# Patient Record
Sex: FEMALE | Race: WHITE | HISPANIC OR LATINO | Employment: UNEMPLOYED | URBAN - METROPOLITAN AREA
[De-identification: names, ages, dates, MRNs, and addresses within clinical notes are randomized per-mention and may not be internally consistent; named-entity substitution may affect disease eponyms.]

---

## 2017-01-03 ENCOUNTER — OFFICE VISIT (OUTPATIENT)
Dept: OPHTHALMOLOGY | Facility: CLINIC | Age: 82
End: 2017-01-03
Payer: MEDICARE

## 2017-01-03 DIAGNOSIS — H25.12 NUCLEAR SCLEROSIS, LEFT: Primary | ICD-10-CM

## 2017-01-03 DIAGNOSIS — Z96.1 PSEUDOPHAKIA OF RIGHT EYE: ICD-10-CM

## 2017-01-03 DIAGNOSIS — H16.223 KERATOCONJUNCTIVITIS SICCA DUE TO DECREASED TEAR PRODUCTION, BILATERAL: ICD-10-CM

## 2017-01-03 DIAGNOSIS — H52.4 PRESBYOPIA: ICD-10-CM

## 2017-01-03 PROCEDURE — 92012 INTRM OPH EXAM EST PATIENT: CPT | Mod: S$PBB,,, | Performed by: OPTOMETRIST

## 2017-01-03 PROCEDURE — 99211 OFF/OP EST MAY X REQ PHY/QHP: CPT | Mod: PBBFAC,PO | Performed by: OPTOMETRIST

## 2017-01-03 PROCEDURE — 99999 PR PBB SHADOW E&M-EST. PATIENT-LVL I: CPT | Mod: PBBFAC,,, | Performed by: OPTOMETRIST

## 2017-01-03 NOTE — PROGRESS NOTES
"HPI     Dry Eye    Additional comments: refraction           Comments   PT was seen for full exam on 11/29/16 with DNL. PT was told to rtc 4-5   weeks for refraction.  Restasis bid OU  PT states her eyes feel much better but still notices occasional fb   sensation.          Last edited by Rose Bazzi MA on 1/3/2017 10:07 AM. (History)            Assessment /Plan     For exam results, see Encounter Report.    Nuclear sclerosis, left  Minimal improvement in va OD with refraction, no improvement OS due to cat  Refer to Dr Ann for cat eval, next available  Discussed with pt and her daughter that va may be limited due to AMD / "macular changes"  OCT down today    Keratoconjunctivitis sicca due to decreased tear production, bilateral  Improved since starting drops  Continue Restasis bid OU    Pseudophakia of right eye  Presbyopia      RTC for cat eval with Dr Ann and mOCT, next available                 "

## 2017-02-08 ENCOUNTER — OFFICE VISIT (OUTPATIENT)
Dept: OPHTHALMOLOGY | Facility: CLINIC | Age: 82
End: 2017-02-08
Payer: MEDICARE

## 2017-02-08 DIAGNOSIS — Z96.1 PSEUDOPHAKIA OF RIGHT EYE: ICD-10-CM

## 2017-02-08 DIAGNOSIS — H25.12 NUCLEAR SCLEROSIS, LEFT: Primary | ICD-10-CM

## 2017-02-08 DIAGNOSIS — H04.123 DRY EYE SYNDROME, BILATERAL: ICD-10-CM

## 2017-02-08 PROCEDURE — 99212 OFFICE O/P EST SF 10 MIN: CPT | Mod: PBBFAC,PO | Performed by: OPHTHALMOLOGY

## 2017-02-08 PROCEDURE — 99999 PR PBB SHADOW E&M-EST. PATIENT-LVL II: CPT | Mod: PBBFAC,,, | Performed by: OPHTHALMOLOGY

## 2017-02-08 PROCEDURE — 92136 OPHTHALMIC BIOMETRY: CPT | Mod: PBBFAC,PO,LT | Performed by: OPHTHALMOLOGY

## 2017-02-08 PROCEDURE — 92014 COMPRE OPH EXAM EST PT 1/>: CPT | Mod: S$PBB,,, | Performed by: OPHTHALMOLOGY

## 2017-02-08 RX ORDER — DIFLUPREDNATE OPHTHALMIC 0.5 MG/ML
1 EMULSION OPHTHALMIC 4 TIMES DAILY
Qty: 1 BOTTLE | Refills: 1 | Status: SHIPPED | OUTPATIENT
Start: 2017-02-08 | End: 2017-03-10

## 2017-02-08 RX ORDER — POLYMYXIN B SULFATE AND TRIMETHOPRIM 1; 10000 MG/ML; [USP'U]/ML
1 SOLUTION OPHTHALMIC 4 TIMES DAILY
Qty: 1 BOTTLE | Refills: 1 | OUTPATIENT
Start: 2017-02-08 | End: 2017-02-18

## 2017-02-08 NOTE — PROGRESS NOTES
HPI     Cataract    Additional comments: Possible eval per DNL           Comments   Pt states that her VA has been ok. Has been better. Can notice decline.   Patient's daughter is here and serves as an .    PCP: Dr. Felder  Referred by DNL    AMD  NS OS  Dry Eye    Restasis BID OU       Last edited by AYDIN Sanders on 2/8/2017 10:14 AM. (History)            Assessment /Plan     For exam results, see Encounter Report.      ICD-10-CM ICD-9-CM    1. Nuclear sclerosis, left H25.12 366.16 Visually Significant Cataract OS  Patient reports decreased vision consistent with the clinical amount of lenticular opacity, which reaches the level of visual significance and affects activities of daily living including reading and glare. Risks, benefits, and alternatives to cataract surgery were discussed.   The pt expressed a desire to proceed with surgery with the potential for some reasonable degree of visual improvement.    Discussed IOL options and refractive outcomes for this patient.    Phaco left eye,   Block  monofocal IOL.  Will aim for distance  Referral to [facility] for Ophthalmic [facility]  Prescriptions sent for preoperative medications  Durezol, Polytrim, and Ilevro  Explained that patient may need glasses after surgery.  Discussed that vision may be limited by astigmatism.           2. Pseudophakia of right eye Z96.1 V43.1    3. Dry eye syndrome, bilateral H04.123 375.15        Return for cataract surgery left eye

## 2017-03-07 ENCOUNTER — TELEPHONE (OUTPATIENT)
Dept: OPHTHALMOLOGY | Facility: CLINIC | Age: 82
End: 2017-03-07

## 2017-03-07 NOTE — TELEPHONE ENCOUNTER
----- Message from Norma Maxwell sent at 3/7/2017 10:44 AM CST -----  Contact: patient  Patient called to advise that Polymyxin B not called into pharmacy.  Drops start today, so please call them in today to pharmacy on record, then contact patient to advise at number given.  Thanks,  Norma

## 2017-03-07 NOTE — TELEPHONE ENCOUNTER
Called patient to inform that drops had been called into pharmacy. No answer at 3 numbers listed in chart. Message left on home phone that drops had been called in and to call and verify with pharmacy that they are ready prior to attempting to .

## 2017-03-10 ENCOUNTER — OFFICE VISIT (OUTPATIENT)
Dept: OPHTHALMOLOGY | Facility: CLINIC | Age: 82
End: 2017-03-10
Payer: MEDICARE

## 2017-03-10 DIAGNOSIS — Z98.42 CATARACT EXTRACTION STATUS, LEFT: Primary | ICD-10-CM

## 2017-03-10 PROCEDURE — 99024 POSTOP FOLLOW-UP VISIT: CPT | Mod: ,,, | Performed by: OPHTHALMOLOGY

## 2017-03-10 PROCEDURE — 99211 OFF/OP EST MAY X REQ PHY/QHP: CPT | Mod: PBBFAC,PO | Performed by: OPHTHALMOLOGY

## 2017-03-10 PROCEDURE — 99999 PR PBB SHADOW E&M-EST. PATIENT-LVL I: CPT | Mod: PBBFAC,,, | Performed by: OPHTHALMOLOGY

## 2017-03-10 RX ORDER — POLYMYXIN B SULFATE AND TRIMETHOPRIM 1; 10000 MG/ML; [USP'U]/ML
1 SOLUTION OPHTHALMIC 4 TIMES DAILY
COMMUNITY
End: 2017-04-18 | Stop reason: ALTCHOICE

## 2017-03-10 NOTE — PROGRESS NOTES
HPI     Patient is here for one day phaco os.    PCP: Dr. Felder  Referred by DNL  PCIOL OD X 10 YEARS   PCIOL OS 03/09/17  AMD  NS OS  Dry Eye    Restasis BID OU    OS DUREZOL QID, POLY TRIM QID, ILEVRO ONCE DAILY            Last edited by AYDIN Sanders on 3/10/2017  8:14 AM.         Assessment /Plan     For exam results, see Encounter Report.      ICD-10-CM ICD-9-CM    1. Cataract extraction status, left Z98.42 V45.61        PO Day 1 S/P Phaco/IOL  left eye  Doing well.    Use Durezol QID  Ilevro daily  Polymyxin B 4 x day  Reinstructed in importance of absolute compliance with Post-OP instructions including medications, shield at bedtime, and limitation of activities. Follow up appointments in approximately one and six weeks or call immediately for increased pain, redness or vision loss.   Will resume care with Dr. Villeda at her next visit in 1 week

## 2017-03-21 ENCOUNTER — OFFICE VISIT (OUTPATIENT)
Dept: OPHTHALMOLOGY | Facility: CLINIC | Age: 82
End: 2017-03-21
Payer: MEDICARE

## 2017-03-21 DIAGNOSIS — Z98.42 CATARACT EXTRACTION STATUS, LEFT: Primary | ICD-10-CM

## 2017-03-21 PROCEDURE — 99024 POSTOP FOLLOW-UP VISIT: CPT | Mod: ,,, | Performed by: OPHTHALMOLOGY

## 2017-03-21 PROCEDURE — 99211 OFF/OP EST MAY X REQ PHY/QHP: CPT | Mod: PBBFAC,PO | Performed by: OPHTHALMOLOGY

## 2017-03-21 PROCEDURE — 99999 PR PBB SHADOW E&M-EST. PATIENT-LVL I: CPT | Mod: PBBFAC,,, | Performed by: OPHTHALMOLOGY

## 2017-03-21 RX ORDER — DIFLUPREDNATE OPHTHALMIC 0.5 MG/ML
1 EMULSION OPHTHALMIC 4 TIMES DAILY
COMMUNITY
End: 2017-04-18 | Stop reason: ALTCHOICE

## 2017-03-21 NOTE — PROGRESS NOTES
HPI     Post-op Evaluation    Additional comments: OS; Durezol, Polytrim QID, Ilevro QD           Comments   1 wk PO PCIOL OS  No pain or discomfort  VA improving OS    PCP: Dr. Felder  Referred by DNL  PCIOL OD X 10 YEARS   PCIOL OS +23.5 SN60WF / CDE:27.94 / 03/09/17  AMD  Dry Eye    Restasis BID OU    OS DUREZOL QID, POLY TRIM QID, ILEVRO ONCE DAILY       Last edited by Skylar Schultz on 3/21/2017 10:59 AM. (History)            Assessment /Plan     For exam results, see Encounter Report.      ICD-10-CM ICD-9-CM    1. Cataract extraction status, left Z98.42 V45.61        PO Week 1 S/P Phaco/ IOL left eye:   Doing well with no evidence of infection or abnormal inflammation.     MOCT Done today and WNL    D/C antibiotic drops  Taper Durezol weekly per instruction sheet.  Ilevro daily 7 more days.  Resume normal activitites and d/c eye shield.  OTC reading glasses can be used until evaluated for final MR.  D/c Shield at night    RTC 4 weeks  With Dr. Nicole  or PRN pain, redness, vision loss, or other concerns.

## 2017-04-18 ENCOUNTER — OFFICE VISIT (OUTPATIENT)
Dept: OPHTHALMOLOGY | Facility: CLINIC | Age: 82
End: 2017-04-18
Payer: MEDICARE

## 2017-04-18 DIAGNOSIS — Z98.42 CATARACT EXTRACTION STATUS, LEFT: Primary | ICD-10-CM

## 2017-04-18 PROCEDURE — 99024 POSTOP FOLLOW-UP VISIT: CPT | Mod: ,,, | Performed by: OPTOMETRIST

## 2017-04-18 PROCEDURE — 99999 PR PBB SHADOW E&M-EST. PATIENT-LVL I: CPT | Mod: PBBFAC,,, | Performed by: OPTOMETRIST

## 2017-04-18 PROCEDURE — 99211 OFF/OP EST MAY X REQ PHY/QHP: CPT | Mod: PBBFAC,PO | Performed by: OPTOMETRIST

## 2017-04-18 NOTE — PROGRESS NOTES
HPI     Post-op Evaluation    Additional comments: 4 wk s/p phaco os. no pain or irritation.            Comments   Last seen by mgm 3/21/17 for cataract post op. Last seen by dnl on   1/3/2017    PCIOL OD X 10 YEARS   PCIOL OS +23.5 SN60WF / CDE:27.94 / 03/09/17 w/mgm  AMD  Dry Eye    Restasis BID OU       Last edited by Anya Cohen MA on 4/18/2017 10:42 AM. (History)            Assessment /Plan     For exam results, see Encounter Report.    Cataract extraction status, left    Doing well OU  New spec rx given    Eyeglass Final Rx     Eyeglass Final Rx      Sphere Cylinder Axis   Right -1.00 +1.25 175   Left -2.00 +2.50 175       Type:  SVL    Expiration Date:  4/19/2018    Comments:  distance      Eyeglass Final Rx #2      Sphere Cylinder Axis   Right +1.75 +1.25 175   Left +0.75 +2.50 175       Type:  SVL    Expiration Date:  4/19/2018    Comments:  Near/reading              Pt requested SVL    RTC 6 months for dilated eye exam, PRN sooner if any problems or concerns

## 2017-08-10 ENCOUNTER — OFFICE VISIT (OUTPATIENT)
Dept: INTERNAL MEDICINE | Facility: CLINIC | Age: 82
End: 2017-08-10
Payer: MEDICARE

## 2017-08-10 ENCOUNTER — LAB VISIT (OUTPATIENT)
Dept: LAB | Facility: HOSPITAL | Age: 82
End: 2017-08-10
Attending: FAMILY MEDICINE
Payer: MEDICARE

## 2017-08-10 VITALS
BODY MASS INDEX: 32.46 KG/M2 | WEIGHT: 171.94 LBS | SYSTOLIC BLOOD PRESSURE: 153 MMHG | RESPIRATION RATE: 16 BRPM | HEART RATE: 72 BPM | DIASTOLIC BLOOD PRESSURE: 71 MMHG | TEMPERATURE: 98 F | OXYGEN SATURATION: 96 % | HEIGHT: 61 IN

## 2017-08-10 DIAGNOSIS — R06.00 PAROXYSMAL NOCTURNAL DYSPNEA: ICD-10-CM

## 2017-08-10 DIAGNOSIS — R06.09 DYSPNEA ON EXERTION: ICD-10-CM

## 2017-08-10 DIAGNOSIS — I10 ESSENTIAL HYPERTENSION: ICD-10-CM

## 2017-08-10 DIAGNOSIS — I10 ESSENTIAL HYPERTENSION: Primary | ICD-10-CM

## 2017-08-10 LAB
ALBUMIN SERPL BCP-MCNC: 3.9 G/DL
ALP SERPL-CCNC: 84 U/L
ALT SERPL W/O P-5'-P-CCNC: 21 U/L
ANION GAP SERPL CALC-SCNC: 9 MMOL/L
AST SERPL-CCNC: 28 U/L
BASOPHILS # BLD AUTO: 0.04 K/UL
BASOPHILS NFR BLD: 0.6 %
BILIRUB SERPL-MCNC: 0.3 MG/DL
BUN SERPL-MCNC: 16 MG/DL
CALCIUM SERPL-MCNC: 8.8 MG/DL
CHLORIDE SERPL-SCNC: 104 MMOL/L
CO2 SERPL-SCNC: 26 MMOL/L
CREAT SERPL-MCNC: 0.7 MG/DL
DIFFERENTIAL METHOD: NORMAL
EOSINOPHIL # BLD AUTO: 0.3 K/UL
EOSINOPHIL NFR BLD: 4.7 %
ERYTHROCYTE [DISTWIDTH] IN BLOOD BY AUTOMATED COUNT: 13.6 %
EST. GFR  (AFRICAN AMERICAN): >60 ML/MIN/1.73 M^2
EST. GFR  (NON AFRICAN AMERICAN): >60 ML/MIN/1.73 M^2
GLUCOSE SERPL-MCNC: 107 MG/DL
HCT VFR BLD AUTO: 38 %
HGB BLD-MCNC: 12.4 G/DL
LYMPHOCYTES # BLD AUTO: 2.8 K/UL
LYMPHOCYTES NFR BLD: 40.8 %
MCH RBC QN AUTO: 30.3 PG
MCHC RBC AUTO-ENTMCNC: 32.6 G/DL
MCV RBC AUTO: 93 FL
MONOCYTES # BLD AUTO: 0.5 K/UL
MONOCYTES NFR BLD: 7.8 %
NEUTROPHILS # BLD AUTO: 3.1 K/UL
NEUTROPHILS NFR BLD: 46 %
PLATELET # BLD AUTO: 257 K/UL
PMV BLD AUTO: 9.5 FL
POTASSIUM SERPL-SCNC: 4.1 MMOL/L
PROT SERPL-MCNC: 7.5 G/DL
RBC # BLD AUTO: 4.09 M/UL
SODIUM SERPL-SCNC: 139 MMOL/L
WBC # BLD AUTO: 6.79 K/UL

## 2017-08-10 PROCEDURE — 1159F MED LIST DOCD IN RCRD: CPT | Mod: ,,, | Performed by: FAMILY MEDICINE

## 2017-08-10 PROCEDURE — 36415 COLL VENOUS BLD VENIPUNCTURE: CPT | Mod: PO

## 2017-08-10 PROCEDURE — 99999 PR PBB SHADOW E&M-EST. PATIENT-LVL III: CPT | Mod: PBBFAC,,, | Performed by: FAMILY MEDICINE

## 2017-08-10 PROCEDURE — 3008F BODY MASS INDEX DOCD: CPT | Mod: ,,, | Performed by: FAMILY MEDICINE

## 2017-08-10 PROCEDURE — 85025 COMPLETE CBC W/AUTO DIFF WBC: CPT | Mod: PO

## 2017-08-10 PROCEDURE — 80053 COMPREHEN METABOLIC PANEL: CPT | Mod: PO

## 2017-08-10 PROCEDURE — 99214 OFFICE O/P EST MOD 30 MIN: CPT | Mod: S$PBB,,, | Performed by: FAMILY MEDICINE

## 2017-08-10 PROCEDURE — 1126F AMNT PAIN NOTED NONE PRSNT: CPT | Mod: ,,, | Performed by: FAMILY MEDICINE

## 2017-08-10 RX ORDER — AMLODIPINE BESYLATE 10 MG/1
10 TABLET ORAL DAILY
Qty: 90 TABLET | Refills: 0 | Status: SHIPPED | OUTPATIENT
Start: 2017-08-10 | End: 2017-09-11

## 2017-08-10 NOTE — PROGRESS NOTES
Subjective:       Patient ID: Josephine Squires is a 86 y.o. female.    Chief Complaint: Fatigue; Headache; and Dizziness    HPI  Josephine is here today with her daughter because over the last month she has been feeling more fatigued and has had some spells whenever she wakes up in the morning of lightheadedness and in fact she has had some falls over the last month or so.  She describes the feeling as somewhat like a hangover.  She does not have this sensation every day and most of this time is fine.  They have been monitoring her blood pressure at home and it has been quite elevated with sometimes having systolic readings around 180.  She has been taking her medication consistently at 5 mg of amlodipine.  She does continue to have to sleep on 3-4 pillows every night to help keep her head elevated.  Whenever she rolls off the pillow she will have a sensation of choking and feels like she is short of breath.  She does have decreased exercise tolerance and with walking will have to take frequent breaks to catch her breath.    No family history on file.    Current Outpatient Prescriptions:     amlodipine (NORVASC) 10 MG tablet, Take 1 tablet (10 mg total) by mouth once daily., Disp: 90 tablet, Rfl: 0    aspirin (ECOTRIN) 81 MG EC tablet, Take 81 mg by mouth., Disp: , Rfl:     cycloSPORINE (RESTASIS) 0.05 % ophthalmic emulsion, Place 0.4 mLs (1 drop total) into both eyes 2 (two) times daily., Disp: 180 vial, Rfl: 3    atorvastatin (LIPITOR) 10 MG tablet, Take 1 tablet (10 mg total) by mouth once daily., Disp: 90 tablet, Rfl: 3    Review of Systems   Constitutional: Positive for fatigue. Negative for chills and fever.   Respiratory: Positive for shortness of breath. Negative for cough.    Cardiovascular: Negative for chest pain.   Gastrointestinal: Negative for abdominal pain.   Skin: Negative for rash.   Neurological: Positive for light-headedness. Negative for dizziness.       Objective:   BP (!) 153/71 (BP Location: Left  "arm, Patient Position: Sitting, BP Method: Large (Automatic))   Pulse 72   Temp 98.1 °F (36.7 °C) (Tympanic)   Resp 16   Ht 5' 1" (1.549 m)   Wt 78 kg (171 lb 15.3 oz)   SpO2 96%   BMI 32.49 kg/m²      Physical Exam   Constitutional: She is oriented to person, place, and time. She appears well-developed and well-nourished.   HENT:   Head: Normocephalic and atraumatic.   Eyes: Conjunctivae are normal.   Cardiovascular: Normal rate.    Pulmonary/Chest: Effort normal. No respiratory distress.   Musculoskeletal: She exhibits no edema.   Neurological: She is alert and oriented to person, place, and time. Coordination normal.   Skin: Skin is warm and dry. No rash noted.   Psychiatric: She has a normal mood and affect. Her behavior is normal.   Vitals reviewed.      Assessment & Plan     1. Essential hypertension  Elevated today.  Seems it is also been elevated frequently at home.  We will increase her medication back to 10 mg of amlodipine.  - Comprehensive metabolic panel; Future    2. Paroxysmal nocturnal dyspnea  Still concerned that her symptoms could be secondary to cardiac insufficiency versus sleep apnea.  She also has a known history of GERD which could be contributing.  We'll like to get some blood work and also an echocardiogram to look into the symptoms that she is having.  I do not think they are postural in nature as she had a negative lateral gaze test and does not feel dizziness whenever she was laying down or sitting up in the exam room.    3. Dyspnea on exertion  As above  - CBC auto differential; Future  - 2D Echo w/ Color Flow Doppler; Future    Would also consider starting her back on a PPI if the symptoms continued.  We'll have her follow-up in 4 weeks  "

## 2017-08-11 ENCOUNTER — HOSPITAL ENCOUNTER (OUTPATIENT)
Dept: CARDIOLOGY | Facility: CLINIC | Age: 82
Discharge: HOME | End: 2017-08-11
Payer: MEDICARE

## 2017-08-11 DIAGNOSIS — R06.09 DYSPNEA ON EXERTION: ICD-10-CM

## 2017-08-11 LAB
AORTIC VALVE REGURGITATION: NORMAL
DIASTOLIC DYSFUNCTION: NO
MITRAL VALVE REGURGITATION: NORMAL
RETIRED EF AND QEF - SEE NOTES: 60 (ref 55–65)

## 2017-08-11 PROCEDURE — 93306 TTE W/DOPPLER COMPLETE: CPT | Mod: PBBFAC,PO | Performed by: NUCLEAR MEDICINE

## 2017-09-11 ENCOUNTER — OFFICE VISIT (OUTPATIENT)
Dept: INTERNAL MEDICINE | Facility: CLINIC | Age: 82
End: 2017-09-11
Payer: MEDICARE

## 2017-09-11 VITALS
DIASTOLIC BLOOD PRESSURE: 77 MMHG | WEIGHT: 173.31 LBS | HEIGHT: 61 IN | HEART RATE: 69 BPM | OXYGEN SATURATION: 96 % | RESPIRATION RATE: 18 BRPM | TEMPERATURE: 98 F | SYSTOLIC BLOOD PRESSURE: 167 MMHG | BODY MASS INDEX: 32.72 KG/M2

## 2017-09-11 DIAGNOSIS — R51.9 NONINTRACTABLE EPISODIC HEADACHE, UNSPECIFIED HEADACHE TYPE: ICD-10-CM

## 2017-09-11 DIAGNOSIS — I10 ESSENTIAL HYPERTENSION: Primary | ICD-10-CM

## 2017-09-11 PROCEDURE — 99214 OFFICE O/P EST MOD 30 MIN: CPT | Mod: S$PBB,,, | Performed by: FAMILY MEDICINE

## 2017-09-11 PROCEDURE — 99999 PR PBB SHADOW E&M-EST. PATIENT-LVL III: CPT | Mod: PBBFAC,,, | Performed by: FAMILY MEDICINE

## 2017-09-11 PROCEDURE — 99213 OFFICE O/P EST LOW 20 MIN: CPT | Mod: PBBFAC,PO | Performed by: FAMILY MEDICINE

## 2017-09-11 PROCEDURE — 1159F MED LIST DOCD IN RCRD: CPT | Mod: ,,, | Performed by: FAMILY MEDICINE

## 2017-09-11 PROCEDURE — 1125F AMNT PAIN NOTED PAIN PRSNT: CPT | Mod: ,,, | Performed by: FAMILY MEDICINE

## 2017-09-11 RX ORDER — LISINOPRIL AND HYDROCHLOROTHIAZIDE 10; 12.5 MG/1; MG/1
1 TABLET ORAL DAILY
Qty: 30 TABLET | Refills: 5 | Status: SHIPPED | OUTPATIENT
Start: 2017-09-11 | End: 2018-04-04 | Stop reason: SDUPTHER

## 2017-09-12 NOTE — PROGRESS NOTES
"Subjective:       Patient ID: Josephine Squires is a 86 y.o. female.    Chief Complaint: Follow-up (htn); Joint Swelling (left); and Headache    HPI  Here today to follow-up on recent dizziness episodes.  These have completely resolved since the last visit but have been replaced by near daily headaches over the last month.  Headaches will get somewhat better during the day after she takes medication but usually return later.  She is not waking up with headaches but does have them in the morning soon after waking up at times.  No blurry vision.  She is monitoring her blood pressure at home and it does continue to be elevated.  She also mentioned that she is having more lower extremity edema than normal.  No shortness of breath and no chest pain.    No family history on file.    Current Outpatient Prescriptions:     aspirin (ECOTRIN) 81 MG EC tablet, Take 81 mg by mouth., Disp: , Rfl:     cycloSPORINE (RESTASIS) 0.05 % ophthalmic emulsion, Place 0.4 mLs (1 drop total) into both eyes 2 (two) times daily., Disp: 180 vial, Rfl: 3    atorvastatin (LIPITOR) 10 MG tablet, Take 1 tablet (10 mg total) by mouth once daily., Disp: 90 tablet, Rfl: 3    lisinopril-hydrochlorothiazide (PRINZIDE,ZESTORETIC) 10-12.5 mg per tablet, Take 1 tablet by mouth once daily., Disp: 30 tablet, Rfl: 5    Review of Systems   Constitutional: Negative for chills and fever.   Respiratory: Negative for cough and shortness of breath.    Cardiovascular: Negative for chest pain.   Gastrointestinal: Negative for abdominal pain.   Skin: Negative for rash.   Neurological: Positive for headaches. Negative for dizziness and light-headedness.       Objective:   BP (!) 167/77 (BP Location: Left arm, Patient Position: Sitting, BP Method: Large (Automatic))   Pulse 69   Temp 97.9 °F (36.6 °C) (Tympanic)   Resp 18   Ht 5' 1" (1.549 m)   Wt 78.6 kg (173 lb 4.5 oz)   SpO2 96%   BMI 32.74 kg/m²      Physical Exam   Constitutional: She is oriented to person, " place, and time. She appears well-developed and well-nourished.   HENT:   Head: Normocephalic and atraumatic.   Eyes: Conjunctivae are normal.   Cardiovascular: Normal rate.    Pulmonary/Chest: Effort normal. No respiratory distress.   Musculoskeletal: She exhibits no edema.   Neurological: She is alert and oriented to person, place, and time. Coordination normal.   Skin: Skin is warm and dry. No rash noted.   Psychiatric: She has a normal mood and affect. Her behavior is normal.   Vitals reviewed.      Assessment & Plan     1. Essential hypertension  Continues to be elevated despite being on 10 mg of amlodipine.  Although she has not had any lightheadedness associated with the increase in medication and her dizziness has resolved, she has developed headaches since the increased dose and seems to have a bit more lower extremity edema.  I recommended switching medication completely to lisinopril hydrochlorothiazide to see if this helps was sufficiently with her blood pressure and improves headaches.  Recommended having her follow-up in 2 weeks with blood pressure cuff to see the nurse and evaluate home readings.    2. Nonintractable episodic headache, unspecified headache type  Is slightly relieved with Tylenol like medication.  No red flag symptoms and normal neuro exam.  I think this is likely from elevated blood pressure.

## 2017-09-25 ENCOUNTER — CLINICAL SUPPORT (OUTPATIENT)
Dept: INTERNAL MEDICINE | Facility: CLINIC | Age: 82
End: 2017-09-25
Payer: MEDICARE

## 2017-10-04 VITALS — SYSTOLIC BLOOD PRESSURE: 134 MMHG | DIASTOLIC BLOOD PRESSURE: 78 MMHG

## 2017-10-04 NOTE — PROGRESS NOTES
Reviewed home readings. Well controlled at home and elevated in clinic. Evidence of white coat hypertension. Asymptomatic at this time. Continue same meds.

## 2018-04-05 RX ORDER — LISINOPRIL AND HYDROCHLOROTHIAZIDE 10; 12.5 MG/1; MG/1
TABLET ORAL
Qty: 30 TABLET | Refills: 0 | Status: SHIPPED | OUTPATIENT
Start: 2018-04-05 | End: 2018-05-23 | Stop reason: SDUPTHER

## 2018-04-05 NOTE — TELEPHONE ENCOUNTER
Pt requested refills. Pt is due for a follow up. 30 days supply was sent to pharmacy. Pt will need to f/u with myself or PCP for additional refills. Please help pt schedule.

## 2018-05-17 ENCOUNTER — LAB VISIT (OUTPATIENT)
Dept: LAB | Facility: HOSPITAL | Age: 83
End: 2018-05-17
Attending: FAMILY MEDICINE
Payer: MEDICARE

## 2018-05-17 ENCOUNTER — OFFICE VISIT (OUTPATIENT)
Dept: INTERNAL MEDICINE | Facility: CLINIC | Age: 83
End: 2018-05-17
Payer: MEDICARE

## 2018-05-17 VITALS
WEIGHT: 167.13 LBS | SYSTOLIC BLOOD PRESSURE: 124 MMHG | BODY MASS INDEX: 30.76 KG/M2 | RESPIRATION RATE: 16 BRPM | DIASTOLIC BLOOD PRESSURE: 70 MMHG | HEIGHT: 62 IN | TEMPERATURE: 98 F | HEART RATE: 74 BPM | OXYGEN SATURATION: 96 %

## 2018-05-17 DIAGNOSIS — I10 ESSENTIAL HYPERTENSION: Primary | ICD-10-CM

## 2018-05-17 DIAGNOSIS — I10 ESSENTIAL HYPERTENSION: ICD-10-CM

## 2018-05-17 LAB
ALBUMIN SERPL BCP-MCNC: 4 G/DL
ALP SERPL-CCNC: 77 U/L
ALT SERPL W/O P-5'-P-CCNC: 18 U/L
ANION GAP SERPL CALC-SCNC: 9 MMOL/L
AST SERPL-CCNC: 23 U/L
BASOPHILS # BLD AUTO: 0.06 K/UL
BASOPHILS NFR BLD: 0.8 %
BILIRUB SERPL-MCNC: 0.5 MG/DL
BUN SERPL-MCNC: 12 MG/DL
CALCIUM SERPL-MCNC: 9.5 MG/DL
CHLORIDE SERPL-SCNC: 104 MMOL/L
CHOLEST SERPL-MCNC: 237 MG/DL
CHOLEST/HDLC SERPL: 3.2 {RATIO}
CO2 SERPL-SCNC: 27 MMOL/L
CREAT SERPL-MCNC: 0.7 MG/DL
DIFFERENTIAL METHOD: ABNORMAL
EOSINOPHIL # BLD AUTO: 0.6 K/UL
EOSINOPHIL NFR BLD: 8.1 %
ERYTHROCYTE [DISTWIDTH] IN BLOOD BY AUTOMATED COUNT: 13.7 %
EST. GFR  (AFRICAN AMERICAN): >60 ML/MIN/1.73 M^2
EST. GFR  (NON AFRICAN AMERICAN): >60 ML/MIN/1.73 M^2
GLUCOSE SERPL-MCNC: 101 MG/DL
HCT VFR BLD AUTO: 35.4 %
HDLC SERPL-MCNC: 75 MG/DL
HDLC SERPL: 31.6 %
HGB BLD-MCNC: 11.6 G/DL
LDLC SERPL CALC-MCNC: 139.6 MG/DL
LYMPHOCYTES # BLD AUTO: 1.9 K/UL
LYMPHOCYTES NFR BLD: 27 %
MCH RBC QN AUTO: 30.8 PG
MCHC RBC AUTO-ENTMCNC: 32.8 G/DL
MCV RBC AUTO: 94 FL
MONOCYTES # BLD AUTO: 0.6 K/UL
MONOCYTES NFR BLD: 8.3 %
NEUTROPHILS # BLD AUTO: 4 K/UL
NEUTROPHILS NFR BLD: 55.7 %
NONHDLC SERPL-MCNC: 162 MG/DL
PLATELET # BLD AUTO: 279 K/UL
PMV BLD AUTO: 9.2 FL
POTASSIUM SERPL-SCNC: 4.4 MMOL/L
PROT SERPL-MCNC: 7.4 G/DL
RBC # BLD AUTO: 3.77 M/UL
SODIUM SERPL-SCNC: 140 MMOL/L
TRIGL SERPL-MCNC: 112 MG/DL
WBC # BLD AUTO: 7.19 K/UL

## 2018-05-17 PROCEDURE — 99999 PR PBB SHADOW E&M-EST. PATIENT-LVL III: CPT | Mod: PBBFAC,,, | Performed by: FAMILY MEDICINE

## 2018-05-17 PROCEDURE — 99213 OFFICE O/P EST LOW 20 MIN: CPT | Mod: PBBFAC,PO | Performed by: FAMILY MEDICINE

## 2018-05-17 PROCEDURE — 99213 OFFICE O/P EST LOW 20 MIN: CPT | Mod: S$PBB,,, | Performed by: FAMILY MEDICINE

## 2018-05-17 PROCEDURE — 36415 COLL VENOUS BLD VENIPUNCTURE: CPT | Mod: PO

## 2018-05-17 PROCEDURE — 80061 LIPID PANEL: CPT

## 2018-05-17 PROCEDURE — 85025 COMPLETE CBC W/AUTO DIFF WBC: CPT | Mod: PO

## 2018-05-17 PROCEDURE — 80053 COMPREHEN METABOLIC PANEL: CPT | Mod: PO

## 2018-05-23 RX ORDER — LISINOPRIL AND HYDROCHLOROTHIAZIDE 10; 12.5 MG/1; MG/1
TABLET ORAL
Qty: 30 TABLET | Refills: 0 | Status: SHIPPED | OUTPATIENT
Start: 2018-05-23 | End: 2018-07-09 | Stop reason: SDUPTHER

## 2018-05-23 NOTE — ASSESSMENT & PLAN NOTE
Doing well on current management of blood pressure.  I encouraged her to continue taking the same thing on a routine basis.  She is no longer having any dizziness issues.  No lightheadedness.  It seems that some of the issues with her lightheadedness and fatigue may have been related to some sort of allergy or problem in her house.

## 2018-05-23 NOTE — PROGRESS NOTES
"Subjective:       Patient ID: Josephine Squires is a 87 y.o. female.    Chief Complaint: Medication Refill; Dizziness; and Fatigue    HPI  Here today with her daughter for follow-up on hypertension.  She has been doing pretty well on actually recently came back from another cruise to the [facility] and has another 1 scheduled next month.  She enjoys going on knees and stays pretty active with walking.  She continues to use her cane most of the time.  At the last visit she was saying how lot of time she would feel fatigued and short winded and sometimes lightheaded.  They noticed that this really was happening just whenever she was at her home.  For example, whenever she would go on these cruises she would have no problem getting up and going around.  She was sometimes they and the casino until the early morning hours without any issues with exhaustion.  They therapy as that perhaps the was some sort of bad.  In the house and actually had a cleansing procedure done for the home and this so far has seemed to help    History reviewed. No pertinent family history.    Current Outpatient Prescriptions:     aspirin (ECOTRIN) 81 MG EC tablet, Take 81 mg by mouth., Disp: , Rfl:     cycloSPORINE (RESTASIS) 0.05 % ophthalmic emulsion, Place 0.4 mLs (1 drop total) into both eyes 2 (two) times daily., Disp: 180 vial, Rfl: 3    lisinopril-hydrochlorothiazide (PRINZIDE,ZESTORETIC) 10-12.5 mg per tablet, TAKE 1 TABLET BY MOUTH ONCE DAILY, Disp: 30 tablet, Rfl: 0    Review of Systems   Constitutional: Negative for chills and fever.   Respiratory: Negative for cough and shortness of breath.    Cardiovascular: Negative for chest pain.   Gastrointestinal: Negative for abdominal pain.   Skin: Negative for rash.   Neurological: Negative for dizziness.       Objective:   /70   Pulse 74   Temp 97.8 °F (36.6 °C) (Tympanic)   Resp 16   Ht 5' 1.5" (1.562 m)   Wt 75.8 kg (167 lb 1.7 oz)   SpO2 96%   BMI 31.06 kg/m²      Physical Exam "   Constitutional: She is oriented to person, place, and time. She appears well-developed and well-nourished. No distress.   HENT:   Head: Normocephalic and atraumatic.   Nose: Nose normal.   Eyes: Conjunctivae and EOM are normal. Pupils are equal, round, and reactive to light. Right eye exhibits no discharge. Left eye exhibits no discharge.   Neck: No thyromegaly present.   Cardiovascular: Normal rate and regular rhythm.    No murmur heard.  Pulmonary/Chest: Effort normal and breath sounds normal. No respiratory distress.   Abdominal: Soft. She exhibits no distension.   Musculoskeletal: She exhibits no edema.   Neurological: She is alert and oriented to person, place, and time.   Skin: No rash noted. She is not diaphoretic.   Psychiatric: She has a normal mood and affect. Her behavior is normal.       Assessment & Plan     Problem List Items Addressed This Visit        Cardiac/Vascular    Hypertension - Primary    Current Assessment & Plan     Doing well on current management of blood pressure.  I encouraged her to continue taking the same thing on a routine basis.  She is no longer having any dizziness issues.  No lightheadedness.  It seems that some of the issues with her lightheadedness and fatigue may have been related to some sort of allergy or problem in her house.         Relevant Orders    Lipid panel (Completed)    Comprehensive metabolic panel (Completed)    CBC auto differential (Completed)            No Follow-up on file.

## 2018-07-09 RX ORDER — LISINOPRIL AND HYDROCHLOROTHIAZIDE 10; 12.5 MG/1; MG/1
TABLET ORAL
Qty: 30 TABLET | Refills: 0 | Status: SHIPPED | OUTPATIENT
Start: 2018-07-09 | End: 2018-08-06

## 2018-08-06 RX ORDER — LISINOPRIL AND HYDROCHLOROTHIAZIDE 10; 12.5 MG/1; MG/1
TABLET ORAL
Qty: 30 TABLET | Refills: 5 | Status: SHIPPED | OUTPATIENT
Start: 2018-08-06 | End: 2019-04-08 | Stop reason: SDUPTHER

## 2018-12-17 ENCOUNTER — HOSPITAL ENCOUNTER (OUTPATIENT)
Dept: RADIOLOGY | Facility: HOSPITAL | Age: 83
Discharge: HOME | End: 2018-12-17
Attending: FAMILY MEDICINE
Payer: MEDICARE

## 2018-12-17 ENCOUNTER — OFFICE VISIT (OUTPATIENT)
Dept: INTERNAL MEDICINE | Facility: CLINIC | Age: 83
End: 2018-12-17
Payer: MEDICARE

## 2018-12-17 ENCOUNTER — TELEPHONE (OUTPATIENT)
Dept: INTERNAL MEDICINE | Facility: CLINIC | Age: 83
End: 2018-12-17

## 2018-12-17 VITALS
TEMPERATURE: 97 F | WEIGHT: 167.56 LBS | SYSTOLIC BLOOD PRESSURE: 148 MMHG | DIASTOLIC BLOOD PRESSURE: 77 MMHG | BODY MASS INDEX: 30.83 KG/M2 | HEART RATE: 85 BPM | HEIGHT: 62 IN | OXYGEN SATURATION: 95 % | RESPIRATION RATE: 16 BRPM

## 2018-12-17 DIAGNOSIS — J40 BRONCHITIS: Primary | ICD-10-CM

## 2018-12-17 DIAGNOSIS — J40 BRONCHITIS: ICD-10-CM

## 2018-12-17 LAB
CTP QC/QA: YES
FLUAV AG NPH QL: NEGATIVE
FLUBV AG NPH QL: NEGATIVE

## 2018-12-17 PROCEDURE — 71046 X-RAY EXAM CHEST 2 VIEWS: CPT | Mod: TC,PO

## 2018-12-17 PROCEDURE — 99213 OFFICE O/P EST LOW 20 MIN: CPT | Mod: PBBFAC,25,PO | Performed by: FAMILY MEDICINE

## 2018-12-17 PROCEDURE — 99999 PR PBB SHADOW E&M-EST. PATIENT-LVL III: CPT | Mod: PBBFAC,,, | Performed by: FAMILY MEDICINE

## 2018-12-17 PROCEDURE — 87804 INFLUENZA ASSAY W/OPTIC: CPT | Mod: 59,PBBFAC,PO | Performed by: FAMILY MEDICINE

## 2018-12-17 PROCEDURE — 96372 THER/PROPH/DIAG INJ SC/IM: CPT | Mod: PBBFAC,PO

## 2018-12-17 PROCEDURE — 71046 X-RAY EXAM CHEST 2 VIEWS: CPT | Mod: 26,,, | Performed by: RADIOLOGY

## 2018-12-17 PROCEDURE — 99214 OFFICE O/P EST MOD 30 MIN: CPT | Mod: S$PBB,,, | Performed by: FAMILY MEDICINE

## 2018-12-17 RX ORDER — BETAMETHASONE SODIUM PHOSPHATE AND BETAMETHASONE ACETATE 3; 3 MG/ML; MG/ML
6 INJECTION, SUSPENSION INTRA-ARTICULAR; INTRALESIONAL; INTRAMUSCULAR; SOFT TISSUE
Status: COMPLETED | OUTPATIENT
Start: 2018-12-17 | End: 2018-12-17

## 2018-12-17 RX ORDER — IBUPROFEN 600 MG/1
TABLET ORAL
COMMUNITY
Start: 2018-11-28 | End: 2019-07-22 | Stop reason: ALTCHOICE

## 2018-12-17 RX ORDER — METHYLPREDNISOLONE 4 MG/1
TABLET ORAL
Qty: 1 PACKAGE | Refills: 0 | Status: SHIPPED | OUTPATIENT
Start: 2018-12-17 | End: 2018-12-28

## 2018-12-17 RX ORDER — DOXYCYCLINE 100 MG/1
100 CAPSULE ORAL 2 TIMES DAILY
Qty: 14 CAPSULE | Refills: 0 | Status: SHIPPED | OUTPATIENT
Start: 2018-12-17 | End: 2018-12-24

## 2018-12-17 RX ADMIN — BETAMETHASONE ACETATE AND BETAMETHASONE SODIUM PHOSPHATE 6 MG: 3; 3 INJECTION, SUSPENSION INTRA-ARTICULAR; INTRALESIONAL; INTRAMUSCULAR; SOFT TISSUE at 04:12

## 2018-12-17 NOTE — TELEPHONE ENCOUNTER
----- Message from Gema Duvall sent at 12/17/2018  9:42 AM CST -----  Contact: daughter  Requesting same day access with Dr. Felder for cough,cold.possible pnemonia.please esteban back at [phone].      Thanks,  Gema Duvall

## 2018-12-17 NOTE — TELEPHONE ENCOUNTER
Call returned to daughter, appt scheduled to be evaluated  for cough, congestion and fever with Dr. Reilly available opening today. Daughter okay with appt

## 2018-12-17 NOTE — ASSESSMENT & PLAN NOTE
Although there are no clear signs of pneumonia on x-ray I am treating with antibiotics considering that her symptoms lasted more than month and she says that she felt similar before whenever she had pneumonia.  Also recommended a intramuscular steroid injection with a Medrol Dosepak to only be instituted if she continues to not feel well 2 days from now.  Provided with Combivent

## 2018-12-17 NOTE — PROGRESS NOTES
Subjective:       Patient ID: Josephine Squires is a 87 y.o. female.    Chief Complaint: Nasal Congestion; Cough; and Fever    HPI  Came in today with her daughter complaining of symptoms that have been ongoing for about a month but over the last week have actually worsened.  She was seen by urgent care and given a cough syrup a few weeks ago before going on a cruise.  During the cruise she felt okay and actually improved a little bit but since returning has felt worse with the productive cough, subjective fever, and overall fatigue, nausea and just feeling weak.  She says that the cough is worse at nighttime and in the morning.  No medications that she has taken have helped yet.  Does have a history of pneumonia in she reports that she felt similar whenever she had pneumonia.    No family history on file.    Current Outpatient Medications:     aspirin (ECOTRIN) 81 MG EC tablet, Take 81 mg by mouth., Disp: , Rfl:     lisinopril-hydrochlorothiazide (PRINZIDE,ZESTORETIC) 10-12.5 mg per tablet, TAKE ONE TABLET BY MOUTH ONCE DAILY, Disp: 30 tablet, Rfl: 5    cycloSPORINE (RESTASIS) 0.05 % ophthalmic emulsion, Place 0.4 mLs (1 drop total) into both eyes 2 (two) times daily., Disp: 180 vial, Rfl: 3    doxycycline (MONODOX) 100 MG capsule, Take 1 capsule (100 mg total) by mouth 2 (two) times daily. for 7 days, Disp: 14 capsule, Rfl: 0    ibuprofen (ADVIL,MOTRIN) 600 MG tablet, , Disp: , Rfl:     ipratropium-albuterol (COMBIVENT)  mcg/actuation inhaler, Inhale 1 puff into the lungs 4 (four) times daily. Rescue, Disp: 4 g, Rfl: 0    methylPREDNISolone (MEDROL DOSEPACK) 4 mg tablet, use as directed, Disp: 1 Package, Rfl: 0  No current facility-administered medications for this visit.     Review of Systems   Constitutional: Positive for appetite change, chills, fatigue and fever.   HENT: Positive for congestion and rhinorrhea. Negative for sore throat.    Eyes: Negative for visual disturbance.   Respiratory: Positive  "for cough. Negative for shortness of breath.    Cardiovascular: Negative for chest pain.   Gastrointestinal: Negative for abdominal pain, constipation and diarrhea.   Endocrine: Negative for polydipsia and polyuria.   Genitourinary: Negative for difficulty urinating and menstrual problem.   Skin: Negative for rash.   Neurological: Negative for dizziness.   Hematological: Does not bruise/bleed easily.       Objective:   BP (!) 148/77 (BP Location: Left arm, Patient Position: Sitting, BP Method: Medium (Automatic))   Pulse 85   Temp 97.1 °F (36.2 °C) (Tympanic)   Resp 16   Ht 5' 1.5" (1.562 m)   Wt 76 kg (167 lb 8.8 oz)   SpO2 95%   BMI 31.15 kg/m²      Physical Exam   Constitutional: She is oriented to person, place, and time. She appears well-developed and well-nourished. No distress.   HENT:   Head: Normocephalic and atraumatic.   Nose: Nose normal.   Eyes: Conjunctivae and EOM are normal. Pupils are equal, round, and reactive to light. Right eye exhibits no discharge. Left eye exhibits no discharge.   Neck: No thyromegaly present.   Cardiovascular: Normal rate and regular rhythm.   No murmur heard.  Pulmonary/Chest: Effort normal. No respiratory distress. She has wheezes. She has rales.   Diffuse rales and wheezing consistent with bronchitis.   Abdominal: Soft. She exhibits no distension.   Musculoskeletal: She exhibits no edema.   Neurological: She is alert and oriented to person, place, and time.   Skin: No rash noted. She is not diaphoretic.   Psychiatric: She has a normal mood and affect. Her behavior is normal.       Assessment & Plan     Problem List Items Addressed This Visit        Pulmonary    Bronchitis - Primary    Current Assessment & Plan     Although there are no clear signs of pneumonia on x-ray I am treating with antibiotics considering that her symptoms lasted more than month and she says that she felt similar before whenever she had pneumonia.  Also recommended a intramuscular steroid " injection with a Medrol Dosepak to only be instituted if she continues to not feel well 2 days from now.  Provided with Combivent         Relevant Orders    X-Ray Chest PA And Lateral (Completed)    POCT Influenza A/B (Completed)        No  was necessary as I was able to discuss with patient    No Follow-up on file.

## 2018-12-24 ENCOUNTER — OFFICE VISIT (OUTPATIENT)
Dept: INTERNAL MEDICINE | Facility: CLINIC | Age: 83
End: 2018-12-24
Payer: MEDICARE

## 2018-12-24 ENCOUNTER — HOSPITAL ENCOUNTER (EMERGENCY)
Facility: HOSPITAL | Age: 83
Discharge: HOME | End: 2018-12-24
Attending: EMERGENCY MEDICINE
Payer: MEDICARE

## 2018-12-24 ENCOUNTER — TELEPHONE (OUTPATIENT)
Dept: INTERNAL MEDICINE | Facility: CLINIC | Age: 83
End: 2018-12-24

## 2018-12-24 VITALS
TEMPERATURE: 98 F | DIASTOLIC BLOOD PRESSURE: 60 MMHG | RESPIRATION RATE: 14 BRPM | WEIGHT: 162.5 LBS | SYSTOLIC BLOOD PRESSURE: 119 MMHG | HEART RATE: 80 BPM | BODY MASS INDEX: 30.68 KG/M2 | OXYGEN SATURATION: 95 % | HEIGHT: 61 IN

## 2018-12-24 VITALS
WEIGHT: 160.94 LBS | HEART RATE: 80 BPM | TEMPERATURE: 99 F | SYSTOLIC BLOOD PRESSURE: 108 MMHG | OXYGEN SATURATION: 96 % | RESPIRATION RATE: 18 BRPM | BODY MASS INDEX: 30.41 KG/M2 | DIASTOLIC BLOOD PRESSURE: 60 MMHG

## 2018-12-24 DIAGNOSIS — J40 BRONCHITIS: ICD-10-CM

## 2018-12-24 DIAGNOSIS — R06.02 SOB (SHORTNESS OF BREATH): ICD-10-CM

## 2018-12-24 DIAGNOSIS — J18.9 PNEUMONIA OF RIGHT LOWER LOBE DUE TO INFECTIOUS ORGANISM: Primary | ICD-10-CM

## 2018-12-24 DIAGNOSIS — E87.1 HYPONATREMIA: ICD-10-CM

## 2018-12-24 DIAGNOSIS — I95.1 ORTHOSTATIC HYPOTENSION: ICD-10-CM

## 2018-12-24 DIAGNOSIS — J06.9 UPPER RESPIRATORY TRACT INFECTION, UNSPECIFIED TYPE: Primary | ICD-10-CM

## 2018-12-24 DIAGNOSIS — E87.5 HYPERKALEMIA: ICD-10-CM

## 2018-12-24 LAB
BILIRUB UR QL STRIP: NEGATIVE
CLARITY UR REFRACT.AUTO: CLEAR
COLOR UR AUTO: YELLOW
GLUCOSE UR QL STRIP: NEGATIVE
HGB UR QL STRIP: NEGATIVE
INFLUENZA A, MOLECULAR: NEGATIVE
INFLUENZA B, MOLECULAR: NEGATIVE
KETONES UR QL STRIP: NEGATIVE
LACTATE SERPL-SCNC: 1 MMOL/L
LEUKOCYTE ESTERASE UR QL STRIP: NEGATIVE
NITRITE UR QL STRIP: NEGATIVE
PH UR STRIP: 6 [PH] (ref 5–8)
PROT UR QL STRIP: NEGATIVE
SP GR UR STRIP: 1.02 (ref 1–1.03)
SPECIMEN SOURCE: NORMAL
URN SPEC COLLECT METH UR: NORMAL
UROBILINOGEN UR STRIP-ACNC: NEGATIVE EU/DL

## 2018-12-24 PROCEDURE — 93010 ELECTROCARDIOGRAM REPORT: CPT | Mod: ,,, | Performed by: INTERNAL MEDICINE

## 2018-12-24 PROCEDURE — 81003 URINALYSIS AUTO W/O SCOPE: CPT

## 2018-12-24 PROCEDURE — 25000003 PHARM REV CODE 250: Performed by: EMERGENCY MEDICINE

## 2018-12-24 PROCEDURE — 87502 INFLUENZA DNA AMP PROBE: CPT

## 2018-12-24 PROCEDURE — 99285 EMERGENCY DEPT VISIT HI MDM: CPT | Mod: 25,27

## 2018-12-24 PROCEDURE — 99213 OFFICE O/P EST LOW 20 MIN: CPT | Mod: PBBFAC,PO | Performed by: FAMILY MEDICINE

## 2018-12-24 PROCEDURE — 83605 ASSAY OF LACTIC ACID: CPT

## 2018-12-24 PROCEDURE — 99214 OFFICE O/P EST MOD 30 MIN: CPT | Mod: S$PBB,,, | Performed by: FAMILY MEDICINE

## 2018-12-24 PROCEDURE — 63600175 PHARM REV CODE 636 W HCPCS: Performed by: EMERGENCY MEDICINE

## 2018-12-24 PROCEDURE — 96365 THER/PROPH/DIAG IV INF INIT: CPT

## 2018-12-24 PROCEDURE — 99900035 HC TECH TIME PER 15 MIN (STAT)

## 2018-12-24 PROCEDURE — 99999 PR PBB SHADOW E&M-EST. PATIENT-LVL III: CPT | Mod: PBBFAC,,, | Performed by: FAMILY MEDICINE

## 2018-12-24 PROCEDURE — 87040 BLOOD CULTURE FOR BACTERIA: CPT | Mod: 59

## 2018-12-24 PROCEDURE — 93005 ELECTROCARDIOGRAM TRACING: CPT

## 2018-12-24 PROCEDURE — 96367 TX/PROPH/DG ADDL SEQ IV INF: CPT

## 2018-12-24 RX ORDER — SODIUM CHLORIDE 9 MG/ML
1000 INJECTION, SOLUTION INTRAVENOUS
Status: DISCONTINUED | OUTPATIENT
Start: 2018-12-24 | End: 2018-12-24

## 2018-12-24 RX ORDER — AMOXICILLIN AND CLAVULANATE POTASSIUM 875; 125 MG/1; MG/1
1 TABLET, FILM COATED ORAL 2 TIMES DAILY
Qty: 14 TABLET | Refills: 0 | Status: SHIPPED | OUTPATIENT
Start: 2018-12-24 | End: 2018-12-31

## 2018-12-24 RX ORDER — AZITHROMYCIN 250 MG/1
250 TABLET, FILM COATED ORAL DAILY
Qty: 6 TABLET | Refills: 0 | Status: SHIPPED | OUTPATIENT
Start: 2018-12-24 | End: 2018-12-26 | Stop reason: ALTCHOICE

## 2018-12-24 RX ADMIN — CEFTRIAXONE 1 G: 1 INJECTION, SOLUTION INTRAVENOUS at 02:12

## 2018-12-24 RX ADMIN — SODIUM CHLORIDE 500 ML: 0.9 INJECTION, SOLUTION INTRAVENOUS at 02:12

## 2018-12-24 RX ADMIN — AZITHROMYCIN MONOHYDRATE 500 MG: 500 INJECTION, POWDER, LYOPHILIZED, FOR SOLUTION INTRAVENOUS at 03:12

## 2018-12-24 NOTE — ASSESSMENT & PLAN NOTE
Pt had taken the antibiotics for 5 days and the steroids for three days.  I feel that this is a continuation of her bronchitis  / URI sx.  Lung exam is clear, pt is not dehydrated at this time.  Will run labs today.  Push fluids. Rest.

## 2018-12-24 NOTE — DISCHARGE INSTRUCTIONS
______________    Stop taking the blood pressure medicine with hydrochlorothiazide for now.  You may need a different blood pressure medicine later.    Drink extra fluids.      Take the combination antibiotics as prescribed.      Return here tomorrow during the day any time for a recheck.  Return at any time sooner if worse.      You have a mild case of pneumonia on the right side, and have gotten mildly dehydrated with low sodium and slightly elevated potassium.  These things should all correct well these measures.    _______________

## 2018-12-24 NOTE — TELEPHONE ENCOUNTER
----- Message from Jimmy Reilly MD sent at 12/24/2018 11:43 AM CST -----  Pt has hyponatremia. This may be the reason of her dizziness and symptoms. Go to ED.

## 2018-12-24 NOTE — PROGRESS NOTES
Subjective:       Patient ID: Josephine Squires is a 87 y.o. female.    Chief Complaint: Fatigue; Nasal Congestion; and Dizziness    Seen by       Fatigue   This is a recurrent problem. The current episode started in the past 7 days. The problem occurs constantly. Associated symptoms include congestion, coughing and fatigue. Pertinent negatives include no abdominal pain, sore throat or visual change. Nothing aggravates the symptoms. Treatments tried: abx, steroids. The treatment provided no relief.     Review of Systems   Constitutional: Positive for fatigue.   HENT: Positive for congestion. Negative for sore throat.    Respiratory: Positive for cough.    Gastrointestinal: Negative for abdominal pain.       Objective:      Physical Exam   Constitutional: She appears well-developed and well-nourished. She appears distressed.   In wheelchair   HENT:   Head: Normocephalic and atraumatic.   Nose: Nose normal.   Mouth/Throat: Oropharynx is clear and moist.   Cardiovascular: Normal rate.   Pulmonary/Chest: Effort normal and breath sounds normal. No respiratory distress. She has no wheezes.   Abdominal: Soft. She exhibits no distension. There is no tenderness. There is no guarding.   Musculoskeletal: She exhibits no edema or tenderness.   Skin: Skin is warm and dry. No rash noted. She is not diaphoretic. No erythema.   Good turgor.   Nursing note and vitals reviewed.      Assessment:       1. Upper respiratory tract infection, unspecified type    2. Bronchitis        Plan:     Problem List Items Addressed This Visit        Pulmonary    Bronchitis    Current Assessment & Plan     Pt had taken the antibiotics for 5 days and the steroids for three days.  I feel that this is a continuation of her bronchitis  / URI sx.  Lung exam is clear, pt is not dehydrated at this time.  Will run labs today.  Push fluids. Rest.           Other Visit Diagnoses     Upper respiratory tract infection, unspecified type    -  Primary    Relevant  Orders    CBC auto differential    Comprehensive metabolic panel

## 2018-12-24 NOTE — TELEPHONE ENCOUNTER
Nurse spoke with daughter, results given as per . Daughter inform pt needs to follow up in [place]. Daughter voiced understanding and will bring pt.

## 2018-12-24 NOTE — ED PROVIDER NOTES
Encounter Date: 12/24/2018       History     Chief Complaint   Patient presents with    Abnormal Lab     States she was sent over by Dr. Franks due to low sodium.      Originally from [place], speaks mostly [place], daughter is here and translating for her.  Symptoms ongoing for 6 weeks, reported bronchitis, cough, decreased energy; not better with steroids and 2 rounds of antibiotics.  Has had a negative influenza test and chest x-ray during this time.  Generally is very active, just recently got back from a cruise about 8 days ago.  Reported subjective fever, mild dyspnea, mild chest pain at times.  Some headache. Seen by primary care this morning and had labs done that show low sodium and elevated potassium. She continues to take her ACE-inhibitor and thiazide diuretic.  No weight loss.  No other specific complaints.  Patient is in no distress and is not seen to cough during my exam.  Noted to have mild orthostatic findings on vital signs but normal room air saturation, normal sinus rhythm, and no respiratory distress. She was recently on doxycycline which she took for 4 days, and prior to that amoxicillin which she took for an uncertain number of days.      The history is provided by the patient and a relative. A  was used.     Review of patient's allergies indicates:   Allergen Reactions    Codeine      Past Medical History:   Diagnosis Date    Arthritis     Hypertension     Macular degeneration      Past Surgical History:   Procedure Laterality Date    CATARACT EXTRACTION W/  INTRAOCULAR LENS IMPLANT Left 03/09/2017    CATARACT EXTRACTION W/  INTRAOCULAR LENS IMPLANT Right     EYE SURGERY      HYSTERECTOMY      PCIOL  Bilateral OD 10 YEARS AGO/OS 03/09/17    DR. CONCEPCION DID CAT. SX. OS ONLY     History reviewed. No pertinent family history.  Social History     Tobacco Use    Smoking status: Never Smoker    Smokeless tobacco: Never Used   Substance Use Topics    Alcohol use: No     Drug use: No     Review of Systems   Constitutional: Positive for fatigue and fever. Negative for activity change.   HENT: Negative for congestion, ear pain, facial swelling, nosebleeds, sinus pressure and sore throat.    Eyes: Negative for pain, discharge, redness and visual disturbance.   Respiratory: Positive for cough and shortness of breath. Negative for choking, chest tightness and wheezing.    Cardiovascular: Positive for chest pain. Negative for palpitations and leg swelling.   Gastrointestinal: Negative for abdominal distention, abdominal pain, nausea and vomiting.   Endocrine: Negative for heat intolerance, polydipsia and polyuria.   Genitourinary: Negative for difficulty urinating, dysuria, flank pain, hematuria and urgency.   Musculoskeletal: Negative for back pain, gait problem, joint swelling and myalgias.   Skin: Negative for color change and rash.   Allergic/Immunologic: Negative for environmental allergies and food allergies.   Neurological: Positive for dizziness. Negative for weakness, numbness and headaches.   Hematological: Negative for adenopathy. Does not bruise/bleed easily.   Psychiatric/Behavioral: Negative for agitation and behavioral problems. The patient is not nervous/anxious.    All other systems reviewed and are negative.      Physical Exam     Initial Vitals [12/24/18 1214]   BP Pulse Resp Temp SpO2   (!) 106/58 91 18 98.9 °F (37.2 °C) (!) 94 %      MAP       --         Physical Exam    Nursing note and vitals reviewed.  Constitutional: She appears well-developed and well-nourished. She is not diaphoretic. No distress.   HENT:   Head: Normocephalic and atraumatic.   Mouth/Throat: No oropharyngeal exudate.   Eyes: Conjunctivae and EOM are normal. Pupils are equal, round, and reactive to light. Right eye exhibits no discharge. Left eye exhibits no discharge. No scleral icterus.   Neck: Normal range of motion. Neck supple. No thyromegaly present. No tracheal deviation present. No  JVD present.   Cardiovascular: Normal rate, regular rhythm, normal heart sounds and intact distal pulses. Exam reveals no gallop and no friction rub.    No murmur heard.  Pulmonary/Chest: Breath sounds normal. No stridor. No respiratory distress. She has no wheezes. She has no rhonchi. She has no rales. She exhibits no tenderness.   Abdominal: Soft. Bowel sounds are normal. She exhibits no distension and no mass. There is no tenderness. There is no rebound and no guarding.   Musculoskeletal: Normal range of motion. She exhibits no edema or tenderness.   Neurological: She is alert and oriented to person, place, and time. She has normal strength.   Skin: Skin is warm and dry. No rash and no abscess noted. No erythema.   Psychiatric: She has a normal mood and affect. Her behavior is normal. Judgment and thought content normal.         ED Course   Procedures  Labs Reviewed   INFLUENZA A & B BY MOLECULAR   CULTURE, BLOOD   CULTURE, BLOOD   URINALYSIS, REFLEX TO URINE CULTURE    Narrative:     Preferred Collection Type->Urine, Clean Catch   TROPONIN I   CK   MAGNESIUM   PHOSPHORUS   LACTIC ACID, PLASMA     EKG Readings: (Independently Interpreted)   Initial Reading: No STEMI. Rhythm: Normal Sinus Rhythm. Heart Rate: 89. Ectopy: No Ectopy. Conduction: Normal. Axis: Normal.   Low-voltage QRS, possible old inferior MI, no acute ischemic change.  Noisy baseline.       Imaging Results          X-Ray Chest PA And Lateral (Final result)  Result time 12/24/18 13:27:39    Final result by Leo Luong MD (12/24/18 13:27:39)                 Impression:      Infiltrate right middle lobe is suspected.  Recommend interval follow-up.      Electronically signed by: Leo Luong MD  Date:    12/24/2018  Time:    13:27             Narrative:    EXAMINATION:  XR CHEST PA AND LATERAL    CLINICAL HISTORY:  Shortness of breath    COMPARISON:  12/17/18    FINDINGS:  Cardiac silhouette is normal. Aorta demonstrates atherosclerotic disease.  Infiltrate right middle lobe is suspected.  Atelectasis or scarring seen within the left lower lobe which is stable.  No evidence of pleural effusion or pneumothorax.  Bones appear intact.                                    2:02 PM Stable in the ER.  Expanding workup, have discussed with the patient and family in detail, at this point I believe we will likely be able to treat her as an outpatient with close follow-up here in the emergency department tomorrow.  See orders.    3:14 PM Stable. Proceed with outpatient rx as outlined.                         Clinical Impression:     1. Pneumonia of right lower lobe due to infectious organism    2. SOB (shortness of breath)    3. Hyponatremia    4. Hyperkalemia    5. Orthostatic hypotension           Disposition:   Disposition: Discharged  Condition: Stable                        Dave Stewart MD  12/24/18 [id number]

## 2018-12-25 ENCOUNTER — HOSPITAL ENCOUNTER (EMERGENCY)
Facility: HOSPITAL | Age: 83
Discharge: HOME | End: 2018-12-25
Attending: EMERGENCY MEDICINE
Payer: MEDICARE

## 2018-12-25 VITALS
HEIGHT: 63 IN | WEIGHT: 164.56 LBS | HEART RATE: 83 BPM | BODY MASS INDEX: 29.16 KG/M2 | RESPIRATION RATE: 20 BRPM | TEMPERATURE: 98 F | SYSTOLIC BLOOD PRESSURE: 107 MMHG | OXYGEN SATURATION: 97 % | DIASTOLIC BLOOD PRESSURE: 59 MMHG

## 2018-12-25 DIAGNOSIS — E87.5 HYPERKALEMIA: ICD-10-CM

## 2018-12-25 DIAGNOSIS — E87.1 HYPONATREMIA: ICD-10-CM

## 2018-12-25 DIAGNOSIS — J18.9 PNEUMONIA: Primary | ICD-10-CM

## 2018-12-25 LAB
ALBUMIN SERPL BCP-MCNC: 3.9 G/DL
ALP SERPL-CCNC: 77 U/L
ALT SERPL W/O P-5'-P-CCNC: 13 U/L
ANION GAP SERPL CALC-SCNC: 15 MMOL/L
AST SERPL-CCNC: 14 U/L
BASOPHILS # BLD AUTO: 0.02 K/UL
BASOPHILS NFR BLD: 0.2 %
BILIRUB SERPL-MCNC: 0.6 MG/DL
BUN SERPL-MCNC: 24 MG/DL
CALCIUM SERPL-MCNC: 9.3 MG/DL
CHLORIDE SERPL-SCNC: 94 MMOL/L
CO2 SERPL-SCNC: 20 MMOL/L
CREAT SERPL-MCNC: 0.8 MG/DL
DIFFERENTIAL METHOD: ABNORMAL
EOSINOPHIL # BLD AUTO: 0.2 K/UL
EOSINOPHIL NFR BLD: 1.9 %
ERYTHROCYTE [DISTWIDTH] IN BLOOD BY AUTOMATED COUNT: 12.8 %
EST. GFR  (AFRICAN AMERICAN): >60 ML/MIN/1.73 M^2
EST. GFR  (NON AFRICAN AMERICAN): >60 ML/MIN/1.73 M^2
GLUCOSE SERPL-MCNC: 143 MG/DL
HCT VFR BLD AUTO: 36.6 %
HGB BLD-MCNC: 12.9 G/DL
LYMPHOCYTES # BLD AUTO: 3.3 K/UL
LYMPHOCYTES NFR BLD: 36.5 %
MCH RBC QN AUTO: 31.9 PG
MCHC RBC AUTO-ENTMCNC: 35.2 G/DL
MCV RBC AUTO: 90 FL
MONOCYTES # BLD AUTO: 0.9 K/UL
MONOCYTES NFR BLD: 9.5 %
NEUTROPHILS # BLD AUTO: 4.6 K/UL
NEUTROPHILS NFR BLD: 51 %
PLATELET # BLD AUTO: 404 K/UL
PMV BLD AUTO: 8.5 FL
POTASSIUM SERPL-SCNC: 4.5 MMOL/L
PROT SERPL-MCNC: 7.2 G/DL
RBC # BLD AUTO: 4.05 M/UL
SODIUM SERPL-SCNC: 129 MMOL/L
WBC # BLD AUTO: 8.99 K/UL

## 2018-12-25 PROCEDURE — 99284 EMERGENCY DEPT VISIT MOD MDM: CPT | Mod: 25

## 2018-12-25 PROCEDURE — 80053 COMPREHEN METABOLIC PANEL: CPT

## 2018-12-25 PROCEDURE — 25000003 PHARM REV CODE 250: Performed by: EMERGENCY MEDICINE

## 2018-12-25 PROCEDURE — 85025 COMPLETE CBC W/AUTO DIFF WBC: CPT

## 2018-12-25 PROCEDURE — 96360 HYDRATION IV INFUSION INIT: CPT

## 2018-12-25 PROCEDURE — 94640 AIRWAY INHALATION TREATMENT: CPT

## 2018-12-25 PROCEDURE — 25000242 PHARM REV CODE 250 ALT 637 W/ HCPCS: Performed by: EMERGENCY MEDICINE

## 2018-12-25 RX ORDER — IPRATROPIUM BROMIDE AND ALBUTEROL SULFATE 2.5; .5 MG/3ML; MG/3ML
3 SOLUTION RESPIRATORY (INHALATION)
Status: COMPLETED | OUTPATIENT
Start: 2018-12-25 | End: 2018-12-25

## 2018-12-25 RX ORDER — IPRATROPIUM BROMIDE AND ALBUTEROL SULFATE 2.5; .5 MG/3ML; MG/3ML
3 SOLUTION RESPIRATORY (INHALATION) EVERY 6 HOURS PRN
Qty: 1 BOX | Refills: 0 | Status: SHIPPED | OUTPATIENT
Start: 2018-12-25 | End: 2018-12-28 | Stop reason: SDUPTHER

## 2018-12-25 RX ADMIN — IPRATROPIUM BROMIDE AND ALBUTEROL SULFATE 3 ML: .5; 3 SOLUTION RESPIRATORY (INHALATION) at 10:12

## 2018-12-25 RX ADMIN — SODIUM CHLORIDE 750 ML: 0.9 INJECTION, SOLUTION INTRAVENOUS at 10:12

## 2018-12-25 NOTE — ED NOTES
Here for recheck per MD    Level of Consciousness: Patient is awake, alert, and oriented to person, place, time, and situation. Speech is clear.   HEENT: Symmetrical face, PERRLA, moist mucous membranes, no congestion/drainage, no JVD, pt able to swallow, +dizziness per pt   Appearance: Patient resting comfortably in bed, hygiene and clothing are both intact and appropriate.   Skin: Skin is warm, dry, and intact. Skin is of normal color, free of any skin breakdown. Mucus membranes pink and moist.   Musculoskeletal: Moves all extremities well. Full active ROM. No deformities noted. Denies any weakness. Gait steady, patient ambulates independently, without assistive device.   Respiratory: Airway open and patent. Respirations equal and unlabored.+dry cough. Lung sounds clear upon auscultation. Patient denies any SOB.   Cardiac: Regular rate and rhythm. No peripheral edema noted to bilateral lower extremities. Denies any chest pain or discomfort.   GI: Abdomen soft, non-tender. Bowel sounds present and active in all quadrants x 4. No distention noted. Denies any nausea or vomiting.   : Denies any problem with urination. Denies any problem with bowel movement.   Neurological: Normal sensation reported to all extremities. Symmetrical expressions noted to face. No obvious neurological deficits noted.   Psychosocial: Patient is calm and cooperative, appropriate to situation. Family is involved in patient care.     Patient informed of plan of care, verbalizes understanding, and has no questions or concern at this time. Bed is in the lowest position and locked. Call bell within reach of the patient. Will continue to monitor.

## 2018-12-25 NOTE — DISCHARGE INSTRUCTIONS
__________________      Continue all instructions as per yesterday. OK to use the nebulizer as prescribed as well.    As discussed, the treatment as we are doing now is working, but pneumonia does still remain a significant risk.  Return to the emergency department immediately if worse in any way.  Otherwise, continue all current medications and see your doctor in the office for a recheck tomorrow.  Increase fluid intake, and still do not take the blood pressure medicine with hydrochlorothiazide as we discussed yesterday.        ____________________          __________________

## 2018-12-25 NOTE — ED NOTES
Pt states feels better than yesterday but still a little dizzy. Pt speaks British only, daughters at bedside for interpretation per pt

## 2018-12-25 NOTE — ED PROVIDER NOTES
Encounter Date: 12/25/2018       History     Chief Complaint   Patient presents with    Recheck     Family reports patient DX with PNA and was told to come back to ER for a re check. Patient was seen yesterday      Returns with family for recheck as per instructions.  Feeling little better.  Used a DuoNeb inhaler at home from a family member and felt that it helped, would like a prescription for this.  No new complaints or problems.  Taking antibiotics as prescribed yesterday.      The history is provided by the patient and a relative. No  was used.     Review of patient's allergies indicates:   Allergen Reactions    Codeine      Past Medical History:   Diagnosis Date    Arthritis     Hypertension     Macular degeneration     PNA (pneumonia)      Past Surgical History:   Procedure Laterality Date    CATARACT EXTRACTION W/  INTRAOCULAR LENS IMPLANT Left 03/09/2017    CATARACT EXTRACTION W/  INTRAOCULAR LENS IMPLANT Right     EYE SURGERY      HYSTERECTOMY      PCIOL  Bilateral OD 10 YEARS AGO/OS 03/09/17    DR. CONCEPCION DID CAT. SX. OS ONLY     History reviewed. No pertinent family history.  Social History     Tobacco Use    Smoking status: Never Smoker    Smokeless tobacco: Never Used   Substance Use Topics    Alcohol use: No    Drug use: No     Review of Systems   Constitutional: Negative for activity change, fatigue and fever.   HENT: Negative for congestion, ear pain, facial swelling, nosebleeds, sinus pressure and sore throat.    Eyes: Negative for pain, discharge, redness and visual disturbance.   Respiratory: Positive for cough. Negative for choking, chest tightness, shortness of breath and wheezing.    Cardiovascular: Negative for chest pain, palpitations and leg swelling.   Gastrointestinal: Negative for abdominal distention, abdominal pain, nausea and vomiting.   Endocrine: Negative for heat intolerance, polydipsia and polyuria.   Genitourinary: Negative for difficulty  urinating, dysuria, flank pain, hematuria and urgency.   Musculoskeletal: Negative for back pain, gait problem, joint swelling and myalgias.   Skin: Negative for color change and rash.   Allergic/Immunologic: Negative for environmental allergies and food allergies.   Neurological: Negative for dizziness, weakness, numbness and headaches.   Hematological: Negative for adenopathy. Does not bruise/bleed easily.   Psychiatric/Behavioral: Negative for agitation and behavioral problems. The patient is not nervous/anxious.    All other systems reviewed and are negative.      Physical Exam     Initial Vitals [12/25/18 1003]   BP Pulse Resp Temp SpO2   125/64 96 18 98.9 °F (37.2 °C) 95 %      MAP       --         Physical Exam    Nursing note and vitals reviewed.  Constitutional: She appears well-developed and well-nourished. She is not diaphoretic. No distress.   HENT:   Head: Normocephalic and atraumatic.   Mouth/Throat: No oropharyngeal exudate.   Eyes: Conjunctivae and EOM are normal. Pupils are equal, round, and reactive to light. Right eye exhibits no discharge. Left eye exhibits no discharge. No scleral icterus.   Neck: Normal range of motion. Neck supple. No thyromegaly present. No tracheal deviation present. No JVD present.   Cardiovascular: Normal rate, regular rhythm, normal heart sounds and intact distal pulses. Exam reveals no gallop and no friction rub.    No murmur heard.  Pulmonary/Chest: No stridor. No respiratory distress. She has no wheezes. She has no rhonchi. She has rales. She exhibits no tenderness.   Mild coarse rales left base   Abdominal: Soft. Bowel sounds are normal. She exhibits no distension and no mass. There is no tenderness. There is no rebound and no guarding.   Musculoskeletal: Normal range of motion. She exhibits no edema or tenderness.   Neurological: She is alert and oriented to person, place, and time. She has normal strength.   Skin: Skin is warm and dry. No rash and no abscess noted.  No erythema.   Psychiatric: She has a normal mood and affect. Her behavior is normal. Judgment and thought content normal.         ED Course   Procedures  Labs Reviewed   CBC W/ AUTO DIFFERENTIAL - Abnormal; Notable for the following components:       Result Value    Hematocrit 36.6 (*)     MCH 31.9 (*)     Platelets 404 (*)     MPV 8.5 (*)     All other components within normal limits   COMPREHENSIVE METABOLIC PANEL - Abnormal; Notable for the following components:    Sodium 129 (*)     Chloride 94 (*)     CO2 20 (*)     Glucose 143 (*)     BUN, Bld 24 (*)     All other components within normal limits          Imaging Results          X-Ray Chest PA And Lateral (Final result)  Result time 12/25/18 10:40:29    Final result by Beau Loaj MD (12/25/18 10:40:29)                 Impression:      Slightly increasing subsegmental atelectasis versus pneumonia at the left lung base within the left lower lobe      Electronically signed by: Beau Loja MD  Date:    12/25/2018  Time:    10:40             Narrative:    EXAMINATION:  XR CHEST PA AND LATERAL    CLINICAL HISTORY:  Pneumonia, unspecified organism    TECHNIQUE:  PA and lateral views of the chest were performed.    COMPARISON:  12/24/2018    FINDINGS:  Heart size normal.  Aortic tortuosity.    Shallow inspiration slight elevation right hemidiaphragm.  There is some increasing density at the left lateral lung base.    No effusion.                                11:38 AM She continues to clinically do very well.  Resting comfortably, heart rate in the mid 70s, oxygen saturation 96 or 97% on room air, stable vital signs, no longer orthostatic.  She states that she feels better today compared with yesterday.  No dyspnea.  Discussing with the patient and family.  She continues to be clinically acceptable for outpatient management, although borderline due to age and the fact that her infiltrate has increased slightly.  I suspect this is mostly because of the  additional hydration.  Discussed in detail with patient and family, they prefer continued outpatient management.  I have offered inpatient treatment but they understand this circumstances and wished to continue with outpatient management.  They will follow up with her doctor tomorrow. They will return to the ER as needed/ if worse.                                Clinical Impression:     1. Pneumonia    2. Hyponatremia    3. Hyperkalemia            Disposition:   Disposition: Discharged  Condition: Stable                        Dave Stewart MD  12/25/18 4810

## 2018-12-26 ENCOUNTER — OFFICE VISIT (OUTPATIENT)
Dept: INTERNAL MEDICINE | Facility: CLINIC | Age: 83
End: 2018-12-26
Payer: MEDICARE

## 2018-12-26 ENCOUNTER — HOSPITAL ENCOUNTER (OUTPATIENT)
Dept: RADIOLOGY | Facility: HOSPITAL | Age: 83
Discharge: HOME | End: 2018-12-26
Attending: NURSE PRACTITIONER
Payer: MEDICARE

## 2018-12-26 ENCOUNTER — TELEPHONE (OUTPATIENT)
Dept: FAMILY MEDICINE | Facility: CLINIC | Age: 83
End: 2018-12-26

## 2018-12-26 VITALS
WEIGHT: 163.38 LBS | TEMPERATURE: 98 F | DIASTOLIC BLOOD PRESSURE: 59 MMHG | RESPIRATION RATE: 12 BRPM | HEART RATE: 93 BPM | BODY MASS INDEX: 28.95 KG/M2 | SYSTOLIC BLOOD PRESSURE: 122 MMHG | HEIGHT: 63 IN | OXYGEN SATURATION: 95 %

## 2018-12-26 DIAGNOSIS — J18.9 PNEUMONIA OF LEFT LOWER LOBE DUE TO INFECTIOUS ORGANISM: ICD-10-CM

## 2018-12-26 DIAGNOSIS — J18.9 PNEUMONIA OF RIGHT MIDDLE LOBE DUE TO INFECTIOUS ORGANISM: Primary | ICD-10-CM

## 2018-12-26 DIAGNOSIS — I77.1 TORTUOUS AORTA: ICD-10-CM

## 2018-12-26 DIAGNOSIS — R63.0 DECREASED APPETITE: ICD-10-CM

## 2018-12-26 DIAGNOSIS — E87.1 HYPONATREMIA: ICD-10-CM

## 2018-12-26 PROCEDURE — 71046 X-RAY EXAM CHEST 2 VIEWS: CPT | Mod: TC,PO

## 2018-12-26 PROCEDURE — 71046 X-RAY EXAM CHEST 2 VIEWS: CPT | Mod: 26,,, | Performed by: RADIOLOGY

## 2018-12-26 PROCEDURE — 99214 OFFICE O/P EST MOD 30 MIN: CPT | Mod: PBBFAC,25,PO | Performed by: NURSE PRACTITIONER

## 2018-12-26 PROCEDURE — 99214 OFFICE O/P EST MOD 30 MIN: CPT | Mod: S$PBB,,, | Performed by: NURSE PRACTITIONER

## 2018-12-26 PROCEDURE — 99999 PR PBB SHADOW E&M-EST. PATIENT-LVL IV: CPT | Mod: PBBFAC,,, | Performed by: NURSE PRACTITIONER

## 2018-12-26 RX ORDER — LEVOFLOXACIN 750 MG/1
750 TABLET ORAL DAILY
Qty: 7 TABLET | Refills: 0 | Status: SHIPPED | OUTPATIENT
Start: 2018-12-26 | End: 2018-12-28

## 2018-12-26 NOTE — PROGRESS NOTES
Subjective:       Patient ID: Josephine Squires is a 87 y.o. female.    Chief Complaint: Follow-up (ED) and Pneumonia  Pt reports to clinic with daughter for pneumonia follow up.  Pt diagnosed with LLL pneumonia on 12/24/18.  Placed on azithromycin.  Pt was re evaluated in ER on yesterday in which CXR showed There is some increasing density at the left lateral lung base..  Initial CXR=Infiltrate right middle lobe is suspected.  Atelectasis or scarring seen within the left lower lobe which is stable. Denies fever.  Notes decreased appetite, fatigue and KHAN.  Nebulizer treatment is relieving cough.    HPI  Review of Systems   Constitutional: Positive for appetite change and fatigue. Negative for chills and fever.   HENT: Negative.    Respiratory: Positive for cough, shortness of breath and wheezing.    Cardiovascular: Negative for leg swelling.   Gastrointestinal: Negative.    Genitourinary: Negative.    Skin: Negative.    Neurological: Negative.        Objective:      Physical Exam   Constitutional: She is oriented to person, place, and time. She appears well-developed and well-nourished.   HENT:   Head: Normocephalic.   Mouth/Throat: Mucous membranes are dry.   Eyes: EOM are normal.   Neck: Neck supple.   Cardiovascular: Normal rate and normal heart sounds.   Pulmonary/Chest: Effort normal. She has decreased breath sounds in the right lower field and the left lower field. She has wheezes in the right upper field and the left upper field.   Neurological: She is alert and oriented to person, place, and time.   Skin: Skin is warm and dry.   Vitals reviewed.      Assessment:       1. Pneumonia of left lower lobe due to infectious organism    2. Decreased appetite    3. Hyponatremia        Plan:   Pneumonia of left lower lobe due to infectious organism  -     X-Ray Chest PA And Lateral; Future; Expected date: 12/26/2018  -     CBC auto differential; Future; Expected date: 12/26/2018  -     Comprehensive metabolic panel;  Future; Expected date: 12/26/2018  -     levoFLOXacin (LEVAQUIN) 750 MG tablet; Take 1 tablet (750 mg total) by mouth once daily.  Dispense: 7 tablet; Refill: 0   -concerned about worsening infiltrates and patient's decreased intake.  ER labs reviewed.  Expressed concerns to pt's family. Pt's daughter served as .  Pt and family refuses hospitalization at this time.  Will advance antibiotic therapy with close follow up.  Follow up in 2 days.   -cxr:  Cardiac silhouette and mediastinal contours are unchanged.  Lungs demonstrate persistent left greater than right basilar opacities without large effusions.  Osseous structures are intact.  Decreased appetite    Hyponatremia  -     Comprehensive metabolic panel; Future; Expected date: 12/26/2018      No Follow-up on file.

## 2018-12-26 NOTE — TELEPHONE ENCOUNTER
----- Message from Lexie Estes sent at 12/26/2018 12:16 PM CST -----  Contact: Geri (pt's daughter)   Geri called and stated she was calling to get the pt's test results. She can be reached at [phone].    Thanks,  TF

## 2018-12-28 ENCOUNTER — OFFICE VISIT (OUTPATIENT)
Dept: INTERNAL MEDICINE | Facility: CLINIC | Age: 83
End: 2018-12-28
Payer: MEDICARE

## 2018-12-28 ENCOUNTER — TELEPHONE (OUTPATIENT)
Dept: INTERNAL MEDICINE | Facility: CLINIC | Age: 83
End: 2018-12-28

## 2018-12-28 ENCOUNTER — HOSPITAL ENCOUNTER (OUTPATIENT)
Dept: RADIOLOGY | Facility: HOSPITAL | Age: 83
Discharge: HOME | End: 2018-12-28
Attending: FAMILY MEDICINE
Payer: MEDICARE

## 2018-12-28 VITALS
HEIGHT: 63 IN | BODY MASS INDEX: 29.34 KG/M2 | RESPIRATION RATE: 16 BRPM | TEMPERATURE: 98 F | HEART RATE: 87 BPM | WEIGHT: 165.56 LBS | SYSTOLIC BLOOD PRESSURE: 115 MMHG | OXYGEN SATURATION: 96 % | DIASTOLIC BLOOD PRESSURE: 66 MMHG

## 2018-12-28 DIAGNOSIS — J18.9 COMMUNITY ACQUIRED PNEUMONIA, UNSPECIFIED LATERALITY: ICD-10-CM

## 2018-12-28 DIAGNOSIS — J18.9 COMMUNITY ACQUIRED PNEUMONIA, UNSPECIFIED LATERALITY: Primary | ICD-10-CM

## 2018-12-28 PROBLEM — J40 BRONCHITIS: Status: RESOLVED | Noted: 2018-12-17 | Resolved: 2018-12-28

## 2018-12-28 PROCEDURE — 99214 OFFICE O/P EST MOD 30 MIN: CPT | Mod: S$PBB,,, | Performed by: FAMILY MEDICINE

## 2018-12-28 PROCEDURE — 71046 X-RAY EXAM CHEST 2 VIEWS: CPT | Mod: TC,PO

## 2018-12-28 PROCEDURE — 99999 PR PBB SHADOW E&M-EST. PATIENT-LVL III: CPT | Mod: PBBFAC,,, | Performed by: FAMILY MEDICINE

## 2018-12-28 PROCEDURE — 99213 OFFICE O/P EST LOW 20 MIN: CPT | Mod: PBBFAC,PO | Performed by: FAMILY MEDICINE

## 2018-12-28 PROCEDURE — 71046 X-RAY EXAM CHEST 2 VIEWS: CPT | Mod: 26,,, | Performed by: RADIOLOGY

## 2018-12-28 RX ORDER — IPRATROPIUM BROMIDE AND ALBUTEROL SULFATE 2.5; .5 MG/3ML; MG/3ML
3 SOLUTION RESPIRATORY (INHALATION) EVERY 6 HOURS PRN
Qty: 2 BOX | Refills: 0 | Status: SHIPPED | OUTPATIENT
Start: 2018-12-28 | End: 2019-07-22

## 2018-12-28 NOTE — TELEPHONE ENCOUNTER
Nurse spoke with pt daughter Pili, results given as per . Advised daughter of examples of sodium intake as directed. Pt verbalized understanding

## 2018-12-28 NOTE — TELEPHONE ENCOUNTER
----- Message from Buster Amaro sent at 12/28/2018  9:59 AM CST -----  Contact: Pt.   Pt Daughter is calling regarding requesting to have nurse call Pt daughter.  Pt dsaugther is requesting Follow Up appt. From ER visit. Pt Was unable to get a sooner appt. Pt daughter is asking that Pt be fit in sooner. [phone]           Thank You  nAa Amaro

## 2018-12-28 NOTE — TELEPHONE ENCOUNTER
----- Message from Tuan Felder DO sent at 12/28/2018  5:35 PM CST -----  Sodium improved but not quite normal. Encourage better dietary intake of sodium including crackers, soup or gatorade. Encourage good oral intake

## 2018-12-28 NOTE — PROGRESS NOTES
Subjective:       Patient ID: Josephine Squires is a 87 y.o. female.    Chief Complaint: Follow-up; Pneumonia (Improvement); Fatigue; and Dizziness    HPI   came in today to follow-up from recent emergency room visits where she was diagnosed and treated for pneumonia.  Overall she is doing better and has less cough and shortness of breath however she still feels pretty fatigued.  No fever and no chills.  She is here with her daughter again.  She continues to take amoxicillin and is trying to eat and drink consistently although she does not have much appetite.  She has also been using the DuoNeb treatments about every 6 hr.    History reviewed. No pertinent family history.    Current Outpatient Medications:     albuterol-ipratropium (DUO-NEB) 2.5 mg-0.5 mg/3 mL nebulizer solution, Take 3 mLs by nebulization every 6 (six) hours as needed for Wheezing. Rescue, Disp: 2 Box, Rfl: 0    amoxicillin-clavulanate 875-125mg (AUGMENTIN) 875-125 mg per tablet, Take 1 tablet by mouth 2 (two) times daily. for 7 days, Disp: 14 tablet, Rfl: 0    aspirin (ECOTRIN) 81 MG EC tablet, Take 81 mg by mouth., Disp: , Rfl:     ibuprofen (ADVIL,MOTRIN) 600 MG tablet, , Disp: , Rfl:     lisinopril-hydrochlorothiazide (PRINZIDE,ZESTORETIC) 10-12.5 mg per tablet, TAKE ONE TABLET BY MOUTH ONCE DAILY, Disp: 30 tablet, Rfl: 5    cycloSPORINE (RESTASIS) 0.05 % ophthalmic emulsion, Place 0.4 mLs (1 drop total) into both eyes 2 (two) times daily., Disp: 180 vial, Rfl: 3    Review of Systems   Constitutional: Positive for appetite change and fatigue. Negative for chills and fever.   Respiratory: Negative for cough and shortness of breath.    Cardiovascular: Negative for chest pain.   Gastrointestinal: Negative for abdominal pain.   Skin: Negative for rash.   Neurological: Negative for dizziness.       Objective:   /66 (BP Location: Left arm, Patient Position: Sitting, BP Method: Medium (Automatic))   Pulse 87   Temp 97.6 °F (36.4 °C)  "(Tympanic)   Resp 16   Ht 5' 3" (1.6 m)   Wt 75.1 kg (165 lb 9.1 oz)   SpO2 96%   BMI 29.33 kg/m²      Physical Exam   Constitutional: She is oriented to person, place, and time. She appears well-developed and well-nourished.   HENT:   Head: Normocephalic and atraumatic.   Eyes: Conjunctivae are normal.   Cardiovascular: Normal rate.   Pulmonary/Chest: Effort normal. No respiratory distress. She has no wheezes. She has no rales.   Musculoskeletal: She exhibits no edema.   Neurological: She is alert and oriented to person, place, and time. Coordination normal.   Skin: Skin is warm and dry. No rash noted.   Psychiatric: She has a normal mood and affect. Her behavior is normal.   Vitals reviewed.      Assessment & Plan     Problem List Items Addressed This Visit        Pulmonary    Community acquired pneumonia - Primary    Current Assessment & Plan       Normal lung sounds and her symptoms are reassuring considering that she is getting better slowly.  I reassured her and told her it is normal for her to feel fatigued considering that she is getting over pneumonia.  Getting  Follow-up chest x-ray today and repeating BMP to verify that electrolytes and renal function are normal.  Continue to finish course of Augmentin, use DuoNeb and if symptoms seem to be reverting at all, to come back to the emergency room over the weekend.         Relevant Orders    Basic metabolic panel    X-Ray Chest PA And Lateral            No Follow-up on file.  "

## 2018-12-29 LAB
BACTERIA BLD CULT: NORMAL
BACTERIA BLD CULT: NORMAL

## 2019-04-09 RX ORDER — LISINOPRIL AND HYDROCHLOROTHIAZIDE 10; 12.5 MG/1; MG/1
TABLET ORAL
Qty: 90 TABLET | Refills: 0 | Status: SHIPPED | OUTPATIENT
Start: 2019-04-09 | End: 2019-10-17 | Stop reason: SDUPTHER

## 2019-06-12 ENCOUNTER — LAB VISIT (OUTPATIENT)
Dept: LAB | Facility: HOSPITAL | Age: 84
End: 2019-06-12
Attending: INTERNAL MEDICINE
Payer: MEDICARE

## 2019-06-12 DIAGNOSIS — R00.0 TACHYCARDIA, UNSPECIFIED: ICD-10-CM

## 2019-06-12 DIAGNOSIS — R06.02 SHORTNESS OF BREATH: Primary | ICD-10-CM

## 2019-06-12 DIAGNOSIS — R07.9 CHEST PAIN, UNSPECIFIED: ICD-10-CM

## 2019-06-12 LAB
ALBUMIN SERPL BCP-MCNC: 4.2 G/DL (ref 3.5–5.2)
ALP SERPL-CCNC: 73 U/L (ref 55–135)
ALT SERPL W/O P-5'-P-CCNC: 14 U/L (ref 10–44)
ANION GAP SERPL CALC-SCNC: 10 MMOL/L (ref 8–16)
AST SERPL-CCNC: 20 U/L (ref 10–40)
BASOPHILS # BLD AUTO: 0.05 K/UL (ref 0–0.2)
BASOPHILS NFR BLD: 0.9 % (ref 0–1.9)
BILIRUB SERPL-MCNC: 0.5 MG/DL (ref 0.1–1)
BUN SERPL-MCNC: 13 MG/DL (ref 8–23)
CALCIUM SERPL-MCNC: 9.5 MG/DL (ref 8.7–10.5)
CHLORIDE SERPL-SCNC: 102 MMOL/L (ref 95–110)
CO2 SERPL-SCNC: 25 MMOL/L (ref 23–29)
CREAT SERPL-MCNC: 0.7 MG/DL (ref 0.5–1.4)
DIFFERENTIAL METHOD: NORMAL
EOSINOPHIL # BLD AUTO: 0.3 K/UL (ref 0–0.5)
EOSINOPHIL NFR BLD: 4.7 % (ref 0–8)
ERYTHROCYTE [DISTWIDTH] IN BLOOD BY AUTOMATED COUNT: 13.7 % (ref 11.5–14.5)
EST. GFR  (AFRICAN AMERICAN): >60 ML/MIN/1.73 M^2
EST. GFR  (NON AFRICAN AMERICAN): >60 ML/MIN/1.73 M^2
GLUCOSE SERPL-MCNC: 95 MG/DL (ref 70–110)
HCT VFR BLD AUTO: 37.8 % (ref 37–48.5)
HGB BLD-MCNC: 12.2 G/DL (ref 12–16)
LYMPHOCYTES # BLD AUTO: 2 K/UL (ref 1–4.8)
LYMPHOCYTES NFR BLD: 35.9 % (ref 18–48)
MCH RBC QN AUTO: 30 PG (ref 27–31)
MCHC RBC AUTO-ENTMCNC: 32.3 G/DL (ref 32–36)
MCV RBC AUTO: 93 FL (ref 82–98)
MONOCYTES # BLD AUTO: 0.4 K/UL (ref 0.3–1)
MONOCYTES NFR BLD: 7.7 % (ref 4–15)
NEUTROPHILS # BLD AUTO: 2.8 K/UL (ref 1.8–7.7)
NEUTROPHILS NFR BLD: 50.6 % (ref 38–73)
PLATELET # BLD AUTO: 260 K/UL (ref 150–350)
PMV BLD AUTO: 9.4 FL (ref 9.2–12.9)
POTASSIUM SERPL-SCNC: 4.4 MMOL/L (ref 3.5–5.1)
PROT SERPL-MCNC: 7.4 G/DL (ref 6–8.4)
RBC # BLD AUTO: 4.06 M/UL (ref 4–5.4)
SODIUM SERPL-SCNC: 137 MMOL/L (ref 136–145)
T4 FREE SERPL-MCNC: 0.97 NG/DL (ref 0.71–1.51)
TSH SERPL DL<=0.005 MIU/L-ACNC: 2.32 UIU/ML (ref 0.4–4)
WBC # BLD AUTO: 5.49 K/UL (ref 3.9–12.7)

## 2019-06-12 PROCEDURE — 84439 ASSAY OF FREE THYROXINE: CPT | Mod: PO

## 2019-06-12 PROCEDURE — 36415 COLL VENOUS BLD VENIPUNCTURE: CPT | Mod: PO

## 2019-06-12 PROCEDURE — 85025 COMPLETE CBC W/AUTO DIFF WBC: CPT | Mod: PO

## 2019-06-12 PROCEDURE — 80053 COMPREHEN METABOLIC PANEL: CPT | Mod: PO

## 2019-06-12 PROCEDURE — 84479 ASSAY OF THYROID (T3 OR T4): CPT

## 2019-06-12 PROCEDURE — 80061 LIPID PANEL: CPT

## 2019-06-12 PROCEDURE — 84443 ASSAY THYROID STIM HORMONE: CPT | Mod: PO

## 2019-06-13 LAB
CHOLEST SERPL-MCNC: 253 MG/DL (ref 120–199)
CHOLEST/HDLC SERPL: 3.4 {RATIO} (ref 2–5)
HDLC SERPL-MCNC: 75 MG/DL (ref 40–75)
HDLC SERPL: 29.6 % (ref 20–50)
LDLC SERPL CALC-MCNC: 150 MG/DL (ref 63–159)
NONHDLC SERPL-MCNC: 178 MG/DL
TRIGL SERPL-MCNC: 140 MG/DL (ref 30–150)

## 2019-06-15 LAB — T3RU NFR SERPL: 33 % (ref 28–41)

## 2019-07-18 ENCOUNTER — HOSPITAL ENCOUNTER (EMERGENCY)
Facility: HOSPITAL | Age: 84
Discharge: HOME | End: 2019-07-18
Attending: EMERGENCY MEDICINE
Payer: MEDICARE

## 2019-07-18 VITALS
HEIGHT: 64 IN | DIASTOLIC BLOOD PRESSURE: 72 MMHG | HEART RATE: 64 BPM | TEMPERATURE: 98 F | RESPIRATION RATE: 18 BRPM | SYSTOLIC BLOOD PRESSURE: 150 MMHG | OXYGEN SATURATION: 97 % | WEIGHT: 171.19 LBS | BODY MASS INDEX: 29.23 KG/M2
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
## 2019-07-18 DIAGNOSIS — M25.522 ELBOW PAIN, LEFT: ICD-10-CM

## 2019-07-18 DIAGNOSIS — M54.2 NECK PAIN: ICD-10-CM

## 2019-07-18 DIAGNOSIS — M25.512 SHOULDER PAIN, LEFT: ICD-10-CM

## 2019-07-18 DIAGNOSIS — R07.9 CHEST PAIN: ICD-10-CM

## 2019-07-18 DIAGNOSIS — M25.562 LEFT KNEE PAIN: ICD-10-CM

## 2019-07-18 LAB
ALBUMIN SERPL BCP-MCNC: 4.2 G/DL (ref 3.5–5.2)
ALP SERPL-CCNC: 66 U/L (ref 55–135)
ALT SERPL W/O P-5'-P-CCNC: 17 U/L (ref 10–44)
ANION GAP SERPL CALC-SCNC: 8 MMOL/L (ref 8–16)
AST SERPL-CCNC: 24 U/L (ref 10–40)
BASOPHILS # BLD AUTO: 0.03 K/UL (ref 0–0.2)
BASOPHILS NFR BLD: 0.5 % (ref 0–1.9)
BILIRUB SERPL-MCNC: 0.3 MG/DL (ref 0.1–1)
BNP SERPL-MCNC: 75 PG/ML (ref 0–99)
BUN SERPL-MCNC: 18 MG/DL (ref 8–23)
CALCIUM SERPL-MCNC: 9.6 MG/DL (ref 8.7–10.5)
CHLORIDE SERPL-SCNC: 102 MMOL/L (ref 95–110)
CO2 SERPL-SCNC: 26 MMOL/L (ref 23–29)
CREAT SERPL-MCNC: 0.9 MG/DL (ref 0.5–1.4)
DIFFERENTIAL METHOD: ABNORMAL
EOSINOPHIL # BLD AUTO: 0.3 K/UL (ref 0–0.5)
EOSINOPHIL NFR BLD: 4.8 % (ref 0–8)
ERYTHROCYTE [DISTWIDTH] IN BLOOD BY AUTOMATED COUNT: 13.5 % (ref 11.5–14.5)
EST. GFR  (AFRICAN AMERICAN): >60 ML/MIN/1.73 M^2
EST. GFR  (NON AFRICAN AMERICAN): 57.3 ML/MIN/1.73 M^2
GLUCOSE SERPL-MCNC: 126 MG/DL (ref 70–110)
HCT VFR BLD AUTO: 35.3 % (ref 37–48.5)
HGB BLD-MCNC: 11.6 G/DL (ref 12–16)
LYMPHOCYTES # BLD AUTO: 2.3 K/UL (ref 1–4.8)
LYMPHOCYTES NFR BLD: 36.7 % (ref 18–48)
MCH RBC QN AUTO: 30.9 PG (ref 27–31)
MCHC RBC AUTO-ENTMCNC: 32.9 G/DL (ref 32–36)
MCV RBC AUTO: 94 FL (ref 82–98)
MONOCYTES # BLD AUTO: 0.6 K/UL (ref 0.3–1)
MONOCYTES NFR BLD: 8.7 % (ref 4–15)
NEUTROPHILS # BLD AUTO: 3.1 K/UL (ref 1.8–7.7)
NEUTROPHILS NFR BLD: 49.3 % (ref 38–73)
PLATELET # BLD AUTO: 253 K/UL (ref 150–350)
PMV BLD AUTO: 9.2 FL (ref 9.2–12.9)
POTASSIUM SERPL-SCNC: 4.2 MMOL/L (ref 3.5–5.1)
PROT SERPL-MCNC: 7.3 G/DL (ref 6–8.4)
RBC # BLD AUTO: 3.75 M/UL (ref 4–5.4)
SODIUM SERPL-SCNC: 136 MMOL/L (ref 136–145)
TROPONIN I SERPL DL<=0.01 NG/ML-MCNC: <0.006 NG/ML (ref 0–0.03)
WBC # BLD AUTO: 6.3 K/UL (ref 3.9–12.7)

## 2019-07-18 PROCEDURE — 93005 ELECTROCARDIOGRAM TRACING: CPT | Mod: ER

## 2019-07-18 PROCEDURE — 85025 COMPLETE CBC W/AUTO DIFF WBC: CPT | Mod: ER

## 2019-07-18 PROCEDURE — 93010 ELECTROCARDIOGRAM REPORT: CPT | Mod: ,,, | Performed by: INTERNAL MEDICINE

## 2019-07-18 PROCEDURE — 84484 ASSAY OF TROPONIN QUANT: CPT | Mod: ER

## 2019-07-18 PROCEDURE — 99900035 HC TECH TIME PER 15 MIN (STAT): Mod: ER

## 2019-07-18 PROCEDURE — 99285 EMERGENCY DEPT VISIT HI MDM: CPT | Mod: ER

## 2019-07-18 PROCEDURE — 25000003 PHARM REV CODE 250: Mod: ER | Performed by: EMERGENCY MEDICINE

## 2019-07-18 PROCEDURE — 83880 ASSAY OF NATRIURETIC PEPTIDE: CPT | Mod: ER

## 2019-07-18 PROCEDURE — 93010 EKG 12-LEAD: ICD-10-PCS | Mod: ,,, | Performed by: INTERNAL MEDICINE

## 2019-07-18 PROCEDURE — 80053 COMPREHEN METABOLIC PANEL: CPT | Mod: ER

## 2019-07-18 RX ORDER — ASPIRIN 325 MG
325 TABLET ORAL
Status: COMPLETED | OUTPATIENT
Start: 2019-07-18 | End: 2019-07-18

## 2019-07-18 RX ORDER — ACETAMINOPHEN 500 MG
1000 TABLET ORAL
Status: DISCONTINUED | OUTPATIENT
Start: 2019-07-18 | End: 2019-07-19 | Stop reason: HOSPADM

## 2019-07-18 RX ADMIN — ASPIRIN 325 MG ORAL TABLET 325 MG: 325 PILL ORAL at 10:07

## 2019-07-19 NOTE — ED NOTES
LOC: The patient is awake, alert and aware of environment with an appropriate affect, the patient is oriented x 3 and speaking appropriately.  Patient has chronic chest pain in her left breast/chest wall. She has an appointment for a full cardiac work up and a mammogram August 5th. Due to the chest pain the patient's family has been giving her some Excedrin daily. Today they wanted to get her mind off her ailments and [place] casino the patient fell when she tripped on the carpet and fell on her left side. Pt reports she did hit her head but did not loose consciousness.     APPEARANCE: Patient resting comfortably and in no acute distress, patient is clean and well groomed, patient's clothing is properly fastened.  HEENT: Brief WNL  SKIN: There is a bruise to the left knee.   MUSCULOSKELETAL: The patient has active ROM, but winces with pain when moving her left arm.  Assessed full body ROM. Patient denied any pain to the right side of the body.   RESPIRATORY: Brief WNL  CARDIAC: Brief WNL  GASTRO: Brief WNL  : Brief WNL  Peripheral Vasc: Brief WNL  NEURO: Brief WNL  PSYCH: Brief WNL

## 2019-07-19 NOTE — ED PROVIDER NOTES
Encounter Date: 7/18/2019       History     Chief Complaint   Patient presents with    Fall     tripped on some carpet on her left side and hit her arm and leg. Pain has worsened pver the past couple hours. Has a history of chest pain, tests with Feng scheduled for august 5     88-year-old female with past medical history of high blood pressure presents to the emergency department after a fall.  The fall occurred just prior to arrival.  The patient was at the [facility] with her daughters when she tripped and fell onto a tile floor striking the left knee, left elbow, left shoulder and her head on the ground.  The patient did not lose consciousness.  The patient was having difficulty getting up due to pain.  The patient was reporting constant moderate pain to her left knee, left elbow, left shoulder, and to her head and neck.  The patient denies any focal numbness or weakness.  The patient is denying any vision changes, shortness of breath.  The patient has had constant chest pain for the past 3 months on the left side for which she has been evaluated for, and has upcoming testing.  This chest pain is unchanged today, and is not associated with any palpitations, syncope, shortness of breath, diaphoresis, nausea or vomiting.  Patient denies any incontinence or retention.        Review of patient's allergies indicates:   Allergen Reactions    Codeine      Past Medical History:   Diagnosis Date    Arthritis     Hypertension     Macular degeneration     PNA (pneumonia)      Past Surgical History:   Procedure Laterality Date    CATARACT EXTRACTION W/  INTRAOCULAR LENS IMPLANT Left 03/09/2017    CATARACT EXTRACTION W/  INTRAOCULAR LENS IMPLANT Right     EYE SURGERY      HYSTERECTOMY      PCIOL  Bilateral OD 10 YEARS AGO/OS 03/09/17    DR. CONCEPCION DID CAT. SX. OS ONLY     History reviewed. No pertinent family history.  Social History     Tobacco Use    Smoking status: Never Smoker    Smokeless tobacco: Never Used    Substance Use Topics    Alcohol use: No    Drug use: No     Review of Systems   Constitutional: Negative for diaphoresis and fever.   HENT: Negative for congestion, dental problem and sore throat.    Eyes: Negative for pain and visual disturbance.   Respiratory: Negative for cough and shortness of breath.    Cardiovascular: Positive for chest pain. Negative for palpitations.   Gastrointestinal: Negative for abdominal pain, diarrhea, nausea and vomiting.   Genitourinary: Negative for dysuria and flank pain.   Musculoskeletal: Positive for arthralgias and neck pain. Negative for back pain.   Skin: Negative for rash and wound.   Neurological: Positive for headaches. Negative for weakness and numbness.   Psychiatric/Behavioral: Negative for agitation and confusion.       Physical Exam     Initial Vitals [07/18/19 2013]   BP Pulse Resp Temp SpO2   138/68 86 20 98.5 °F (36.9 °C) 97 %      MAP       --         Physical Exam    Nursing note and vitals reviewed.  Constitutional: She appears well-developed and well-nourished. She is not diaphoretic. No distress.   HENT:   No sign of skull fracture, or basilar skull fracture.  No sign of facial fracture or septal hematoma or intraoral or dental trauma.   Eyes: EOM are normal. Pupils are equal, round, and reactive to light.   Neck: Normal range of motion. Neck supple.   Cardiovascular: Normal rate and regular rhythm.   Pulmonary/Chest: Breath sounds normal. No respiratory distress.   Abdominal: She exhibits no distension. There is no tenderness.   Musculoskeletal:   There is mild tenderness over the left upper arm, and pain with range of motion of the left elbow.  There is also mild pain with range of motion of the left shoulder.  Range of motion is not limited, there is no obvious deformity or bruising to the left upper extremity.  There is midline cervical spine tenderness over the C6 vertebral process with no step-off.  Patient otherwise has no midline spinal tenderness  or step-off.  No pelvic instability.  There is bruising to the left lower leg just inferior to the left patella with some tenderness to palpation and mild pain with range of motion of the left knee.  Again range of motion is not limited in the lower extremities.   Neurological: She is alert and oriented to person, place, and time.   Skin: Skin is warm and dry.   Psychiatric: She has a normal mood and affect.         ED Course   Procedures  Labs Reviewed   CBC W/ AUTO DIFFERENTIAL - Abnormal; Notable for the following components:       Result Value    RBC 3.75 (*)     Hemoglobin 11.6 (*)     Hematocrit 35.3 (*)     All other components within normal limits   COMPREHENSIVE METABOLIC PANEL - Abnormal; Notable for the following components:    Glucose 126 (*)     eGFR if non  57.3 (*)     All other components within normal limits   TROPONIN I   B-TYPE NATRIURETIC PEPTIDE     EKG Readings: (Independently Interpreted)   Ventricular rate of 74 beats per minute. Normal sinus rhythm. Normal axis.  P.r., ears QTC are within normal limits. No STEMI.       Imaging Results          CT Cervical Spine Without Contrast (Final result)  Result time 07/18/19 21:58:14    Final result by Desirae Mulligan MD (07/18/19 21:58:14)                 Impression:      Multilevel degenerative changes, no significant central or neural foraminal stenosis.    All CT scans at this facility use dose modulation, iterative reconstruction and/or weight based dosing when appropriate to reduce radiation dose to as low as reasonably achievable.      Electronically signed by: Desirae Mulligan  Date:    07/18/2019  Time:    21:58             Narrative:    EXAMINATION:  CT CERVICAL SPINE WITHOUT CONTRAST    CLINICAL HISTORY:  Neck pain, first study;    TECHNIQUE:  Axial CT imaging was performed through the cervical spine without intravenous contrast. Multiplanar reformats were reviewed and  interpreted.    COMPARISON:  None    FINDINGS:  Vertebral body heights are normal.Alignment is normal.  There are multilevel osteophytes.  There are mild multilevel degenerative changes.  There is no significant neural foraminal stenosis or central stenosis.  There is mild disc space height loss at C5-C6 and C6-C7. No soft tissue abnormality detected.                               CT Head Without Contrast (Final result)  Result time 07/18/19 21:53:13    Final result by Desirae Mulligan MD (07/18/19 21:53:13)                 Impression:      No CT evidence of acute intracranial abnormality.    All CT scans at this facility use dose modulation, iterative reconstruction and/or weight based dosing when appropriate to reduce radiation dose to as low as reasonably achievable.      Electronically signed by: Desirae Mulligan  Date:    07/18/2019  Time:    21:53             Narrative:    EXAMINATION:  CT HEAD WITHOUT CONTRAST    CLINICAL HISTORY:  Headache, post trauma;    TECHNIQUE:  Axial CT imaging was performed through the head without intravenous contrast.    COMPARISON:  None    FINDINGS:  No hydrocephalus, midline shift, mass effect, or acute intracranial hemorrhage. Cortical sulcal pattern and gray-white matter differentiation is normal. Basilar cisterns and posterior fossa are normal. The visualized paranasal sinuses and mastoid air cells are clear. The skull is intact.                               X-Ray Chest AP Portable (Final result)  Result time 07/18/19 21:50:30    Final result by Desirae Mulligan MD (07/18/19 21:50:30)                 Impression:      No cardiopulmonary process noted.      Electronically signed by: Desirae Mulligan  Date:    07/18/2019  Time:    21:50             Narrative:    EXAMINATION:  XR CHEST AP PORTABLE    CLINICAL HISTORY:  Chest Pain;    COMPARISON:  December 2018    FINDINGS:  No infiltrate or effusion identified.  Cardiomediastinal silhouette is within  normal limits.                               X-Ray Knee 3 View Left (Final result)  Result time 07/18/19 21:52:33    Final result by Desirae Mulligan MD (07/18/19 21:52:33)                 Impression:      Degenerative changes without acute findings.      Electronically signed by: Desirae Mulligan  Date:    07/18/2019  Time:    21:52             Narrative:    EXAMINATION:  XR KNEE 3 VIEW LEFT    CLINICAL HISTORY:  Pain in left knee    COMPARISON:  None    FINDINGS:  There is narrowing of the medial compartment.  Meniscal calcifications are noted.  There are no fractures or aggressive appearing osseous lesions.                               X-Ray Elbow Complete Left (Final result)  Result time 07/18/19 21:51:25    Final result by Desirae Mulligan MD (07/18/19 21:51:25)                 Impression:      Unremarkable exam.      Electronically signed by: Desirae Mulligan  Date:    07/18/2019  Time:    21:51             Narrative:    EXAMINATION:  XR ELBOW COMPLETE 3 VIEW LEFT    CLINICAL HISTORY:  Pain in left elbow    COMPARISON:  None    FINDINGS:  Anatomic alignment.  No fracture or joint effusion visualized.                               X-Ray Shoulder Trauma Left (Final result)  Result time 07/18/19 21:54:44    Final result by Desirae Mulligan MD (07/18/19 21:54:44)                 Impression:      Unremarkable exam.      Electronically signed by: Desirae Mulligan  Date:    07/18/2019  Time:    21:54             Narrative:    EXAMINATION:  XR SHOULDER TRAUMA 3 VIEW LEFT    CLINICAL HISTORY:  Pain in left shoulder    COMPARISON:  None    FINDINGS:  Anatomic alignment.  No fracture or joint effusion visualized.                                           Results for orders placed or performed during the hospital encounter of 07/18/19   CBC auto differential   Result Value Ref Range    WBC 6.30 3.90 - 12.70 K/uL    RBC 3.75 (L) 4.00 - 5.40 M/uL    Hemoglobin 11.6 (L) 12.0 - 16.0 g/dL     Hematocrit 35.3 (L) 37.0 - 48.5 %    Mean Corpuscular Volume 94 82 - 98 fL    Mean Corpuscular Hemoglobin 30.9 27.0 - 31.0 pg    Mean Corpuscular Hemoglobin Conc 32.9 32.0 - 36.0 g/dL    RDW 13.5 11.5 - 14.5 %    Platelets 253 150 - 350 K/uL    MPV 9.2 9.2 - 12.9 fL    Gran # (ANC) 3.1 1.8 - 7.7 K/uL    Lymph # 2.3 1.0 - 4.8 K/uL    Mono # 0.6 0.3 - 1.0 K/uL    Eos # 0.3 0.0 - 0.5 K/uL    Baso # 0.03 0.00 - 0.20 K/uL    Gran% 49.3 38.0 - 73.0 %    Lymph% 36.7 18.0 - 48.0 %    Mono% 8.7 4.0 - 15.0 %    Eosinophil% 4.8 0.0 - 8.0 %    Basophil% 0.5 0.0 - 1.9 %    Differential Method Automated    Comprehensive metabolic panel   Result Value Ref Range    Sodium 136 136 - 145 mmol/L    Potassium 4.2 3.5 - 5.1 mmol/L    Chloride 102 95 - 110 mmol/L    CO2 26 23 - 29 mmol/L    Glucose 126 (H) 70 - 110 mg/dL    BUN, Bld 18 8 - 23 mg/dL    Creatinine 0.9 0.5 - 1.4 mg/dL    Calcium 9.6 8.7 - 10.5 mg/dL    Total Protein 7.3 6.0 - 8.4 g/dL    Albumin 4.2 3.5 - 5.2 g/dL    Total Bilirubin 0.3 0.1 - 1.0 mg/dL    Alkaline Phosphatase 66 55 - 135 U/L    AST 24 10 - 40 U/L    ALT 17 10 - 44 U/L    Anion Gap 8 8 - 16 mmol/L    eGFR if African American >60.0 >60 mL/min/1.73 m^2    eGFR if non  57.3 (A) >60 mL/min/1.73 m^2   Troponin I #1   Result Value Ref Range    Troponin I <0.006 0.000 - 0.026 ng/mL   B-Type natriuretic peptide (BNP)   Result Value Ref Range    BNP 75 0 - 99 pg/mL                ED Course as of Jul 18 2220   Thu Jul 18, 2019 2215 The patient's troponin is negative, and her chest pain has been going on for several months.  Patient is seeing a cardiologist, however she is also getting mammograms of the left breast which seems to be where her pain is coming from today.  She is not have any STEMI on her EKG or ACS today.  Patient does have some contusions and pain in her joints from when she fell, but I cannot identify any fracture, dislocation, brain bleed or other abnormality based off of her  workup and imaging.  Patient is hemodynamically stable and ready for discharge.    10:19 PM Reassessment: I reassessed the pt.  The pt is resting comfortably and is NAD.  Pt states their sx have improved. I Discussed test results, shared treatment plan, specific conditions for return, and the need for f/u. I answered their questions at this time.  Pt understands and agrees to the plan.  The pt has remained hemodynamically stable through ED course and is stable for discharge.       [BA]      ED Course User Index  [BA] Tuan Snowden MD     Clinical Impression:       ICD-10-CM ICD-9-CM   1. Fall, initial encounter W19.XXXA E888.9   2. Shoulder pain, left M25.512 719.41   3. Left knee pain M25.562 719.46   4. Elbow pain, left M25.522 719.42   5. Chest pain R07.9 786.50   6. Neck pain M54.2 723.1         Disposition:   Disposition: Discharged  Condition: Stable                        Tuan Snowden MD  07/18/19 1781

## 2019-07-22 ENCOUNTER — OFFICE VISIT (OUTPATIENT)
Dept: INTERNAL MEDICINE | Facility: CLINIC | Age: 84
End: 2019-07-22
Payer: MEDICARE

## 2019-07-22 VITALS
SYSTOLIC BLOOD PRESSURE: 108 MMHG | WEIGHT: 170.63 LBS | BODY MASS INDEX: 29.13 KG/M2 | HEIGHT: 64 IN | TEMPERATURE: 97 F | HEART RATE: 82 BPM | DIASTOLIC BLOOD PRESSURE: 58 MMHG | RESPIRATION RATE: 12 BRPM | OXYGEN SATURATION: 96 %

## 2019-07-22 DIAGNOSIS — M94.0 COSTOCHONDRITIS: ICD-10-CM

## 2019-07-22 DIAGNOSIS — K21.9 GASTROESOPHAGEAL REFLUX DISEASE WITHOUT ESOPHAGITIS: ICD-10-CM

## 2019-07-22 DIAGNOSIS — I10 ESSENTIAL HYPERTENSION: ICD-10-CM

## 2019-07-22 PROBLEM — J18.9 COMMUNITY ACQUIRED PNEUMONIA: Status: RESOLVED | Noted: 2018-12-28 | Resolved: 2019-07-22

## 2019-07-22 PROCEDURE — 99999 PR PBB SHADOW E&M-EST. PATIENT-LVL IV: CPT | Mod: PBBFAC,,, | Performed by: FAMILY MEDICINE

## 2019-07-22 PROCEDURE — 99999 PR PBB SHADOW E&M-EST. PATIENT-LVL IV: ICD-10-PCS | Mod: PBBFAC,,, | Performed by: FAMILY MEDICINE

## 2019-07-22 PROCEDURE — 99214 OFFICE O/P EST MOD 30 MIN: CPT | Mod: PBBFAC,PO | Performed by: FAMILY MEDICINE

## 2019-07-22 PROCEDURE — 99214 PR OFFICE/OUTPT VISIT, EST, LEVL IV, 30-39 MIN: ICD-10-PCS | Mod: S$PBB,,, | Performed by: FAMILY MEDICINE

## 2019-07-22 PROCEDURE — 99214 OFFICE O/P EST MOD 30 MIN: CPT | Mod: S$PBB,,, | Performed by: FAMILY MEDICINE

## 2019-07-22 RX ORDER — DICLOFENAC SODIUM 10 MG/G
2 GEL TOPICAL DAILY
Qty: 100 G | Refills: 2 | Status: SHIPPED | OUTPATIENT
Start: 2019-07-22

## 2019-07-22 RX ORDER — METHYLPREDNISOLONE 4 MG/1
TABLET ORAL
Qty: 1 PACKAGE | Refills: 0 | Status: SHIPPED | OUTPATIENT
Start: 2019-07-22 | End: 2019-08-26 | Stop reason: ALTCHOICE

## 2019-07-22 NOTE — ASSESSMENT & PLAN NOTE
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  She has had recent workup for coronary artery disease and will be having follow-up with Cardiology within the next couple weeks.  Recommended a trial of Medrol Dosepak and topical Voltaren gel to see if this gives her temporary relief.  I  conferred to her that with this condition it can recur  However she should get some relief with the treatment that we are doing today.

## 2019-07-22 NOTE — PATIENT INSTRUCTIONS
Costocondritis    La costocondritis es luiza inflamación de luiza sallie o del cartílago que une la sallie con el esternón; provoca hipersensibilidad y, a veces, puede ser un dolor carlotta o se puede sentir laney luiza presión. El dolor puede empeorar con ejercicio físico, respiración profunda o movimientos. En algunos casos, el dolor se confunde con un ataque al corazón; sin embargo, la afección no es grave. Siga leyendo para aprender más sobre esta afección y saber cómo se puede tratar.  ¿Cuál es la causa de la costocondritis?  No se conoce con certeza la causa de la costocondritis kenyon puede ser  producida por luiza lesión, luiza infección en el pecho o un episodio de tos. A veces algunas actividades físicas pueden ocasionar costocondritis. Es posible que las mujeres de senos grandes corran más riesgo de contraer esta afección. A menudo, el motivo de la inflamación es desconocido.  Diagnóstico de la costocondritis  No existe luiza prueba para descubrir la costocondritis; la afección se diagnostica según los síntomas que usted tenga. Ward proveedor de atención médica le hará un examen físico, le preguntará valerie síntomas y le examinará el pecho para arley si hay zonas adoloridas. En algunos casos, se realizan pruebas para descartar problemas más serios. Dichos procedimientos pueden incluir pruebas de diagnóstico por imágenes, laney las radiografías de tórax, las tomografías computarizadas o un electrocardiograma.  Tratamiento de la costocondritis  Si se descubre luiza causa, el tratamiento de esta última probablemente aliviará el problema. Con frecuencia, la costocondritis desaparece por sí trinity. La evolución de la enfermeda varía de luiza persona a otra. Generalmente dura de semanas a meses. En algunas oportunidades, los síntomas leves continúan elizabeth meses o años. Para aliviarlos:  · [place] los medicamentos según lo indicado. Eso calmará el dolor y la hinchazón. A menudo se recomienda el ibuprofeno u otros antiinflamatorios no  esteroideos (NSAID, por valerie siglas en inglés). En algunos casos, es posible que tenga que david medicamentos recetados, laney los relajantes musculares.  · Evite las actividades que provocan tensión en la leonel del pecho o la columna.  · Aplique luiza almohadilla térmica (ajústela a luiza temperatura tibia, no caliente) en la leonel del esternón varias veces al día.  · Realice ejercicios de elongación según le hayan indicado  Llame de inmediato al proveedor de atención médica si tiene alguno de estos síntomas:  · Dolor que no se calma con la medicación  · Falta de aire  · Mareos, vértigo o desmayos  · Sensación de ritmo cardíaco irregular o de pulso acelerado   Cualquier persona con dolor de pecho debe consultar con un médico, especialmente aquellos que son mayores y pueden tener riesgo de enfermedades cardíacas.  Date Last Reviewed: 4/30/2014  © 1354-9046 [employer]. [address] [place] [address], [place] [address]. Todos los derechos reservados. Esta información no pretende sustituir la atención médica profesional. Sólo holder médico puede diagnosticar y tratar un problema de [place].

## 2019-07-22 NOTE — PROGRESS NOTES
Subjective:       Patient ID: Josephine Squires is a 88 y.o. female.    Chief Complaint: Fall (chest)    HPI    Here today with her daughter having concerns about recurrent left-sided chest pain that sometimes radiates towards her arm and up towards her neck.  She recently seen in the emergency room after having a fall where she hit her left shoulder knee and head.  She was having a lot of soreness after that but really her chest pain has been going on way before this recent fall.   Evaluated for ACS in the emergency room and there were no concerning findings.  I reviewed the labs and images from that encounter.  Reports that sometimes she has some shortness of breath and says that whenever she takes a deep breath it does cause the pain to feel little worse.  Also with activity moving her arms in doing a lot of work will have a flare-up of the pain.  Sometimes will take Excedrin and has temporary relief from that.  She has an upcoming appointment with Cardiology in a couple weeks and also has follow-up mammogram.  She does have a history of cysts that have been confirmed to be benign in the past.    History reviewed. No pertinent family history.    Current Outpatient Medications:     aspirin (ECOTRIN) 81 MG EC tablet, Take 81 mg by mouth., Disp: , Rfl:     aspirin-acetaminophen-caffeine 250-250-65 mg (EXCEDRIN MIGRAINE) 250-250-65 mg per tablet, Take 1 tablet by mouth every 6 (six) hours as needed for Pain., Disp: , Rfl:     lisinopril-hydrochlorothiazide (PRINZIDE,ZESTORETIC) 10-12.5 mg per tablet, TAKE 1 TABLET BY MOUTH ONCE DAILY, Disp: 90 tablet, Rfl: 0    cycloSPORINE (RESTASIS) 0.05 % ophthalmic emulsion, Place 0.4 mLs (1 drop total) into both eyes 2 (two) times daily., Disp: 180 vial, Rfl: 3    diclofenac sodium (VOLTAREN) 1 % Gel, Apply 2 g topically once daily., Disp: 100 g, Rfl: 2    methylPREDNISolone (MEDROL DOSEPACK) 4 mg tablet, use as directed, Disp: 1 Package, Rfl: 0    Review of Systems  "  Constitutional: Negative for chills and fever.   Respiratory: Negative for cough and shortness of breath.    Cardiovascular: Positive for chest pain.   Gastrointestinal: Negative for abdominal pain.   Skin: Negative for rash.   Neurological: Negative for dizziness.       Objective:   BP (!) 108/58 (BP Location: Left arm, Patient Position: Sitting, BP Method: Medium (Automatic))   Pulse 82   Temp 96.9 °F (36.1 °C) (Tympanic)   Resp 12   Ht 5' 4" (1.626 m)   Wt 77.4 kg (170 lb 10.2 oz)   SpO2 96%   BMI 29.29 kg/m²      Physical Exam   Constitutional: She is oriented to person, place, and time. She appears well-developed and well-nourished. No distress.   HENT:   Head: Normocephalic and atraumatic.   Nose: Nose normal.   Eyes: Pupils are equal, round, and reactive to light. Conjunctivae and EOM are normal. Right eye exhibits no discharge. Left eye exhibits no discharge.   Neck: No thyromegaly present.   Cardiovascular: Normal rate and regular rhythm.   No murmur heard.  Pulmonary/Chest: Effort normal and breath sounds normal. No respiratory distress. She exhibits tenderness.       Abdominal: Soft. She exhibits no distension.   Musculoskeletal: She exhibits no edema.   Neurological: She is alert and oriented to person, place, and time.   Skin: No rash noted. She is not diaphoretic.   Psychiatric: She has a normal mood and affect. Her behavior is normal.       Assessment & Plan     Problem List Items Addressed This Visit        Cardiac/Vascular    Hypertension    Current Assessment & Plan       Well controlled.  Continue on same medication.            GI    GERD (gastroesophageal reflux disease)    Current Assessment & Plan       She has history of GERD however has not been having any symptoms of this recently.  Not having any frequent belching.            Orthopedic    Costochondritis    Current Assessment & Plan       Her symptomatology and exam are consistent with costal chondritis.  She has had recent workup " for coronary artery disease and will be having follow-up with Cardiology within the next couple weeks.  Recommended a trial of Medrol Dosepak and topical Voltaren gel to see if this gives her temporary relief.  I  conferred to her that with this condition it can recur  However she should get some relief with the treatment that we are doing today.                 Follow up if symptoms worsen or fail to improve.    Disclaimer:  This note may have been prepared using voice recognition software, it may have not been extensively proofed, as such there could be errors within the text such as sound alike errors.

## 2019-07-22 NOTE — ASSESSMENT & PLAN NOTE
She has history of GERD however has not been having any symptoms of this recently.  Not having any frequent belching.

## 2019-08-07 ENCOUNTER — TELEPHONE (OUTPATIENT)
Dept: CARDIOLOGY | Facility: CLINIC | Age: 84
End: 2019-08-07

## 2019-08-07 NOTE — TELEPHONE ENCOUNTER
Ms. Squires was notified of her heart cath scheduled for 8/19/19 at 12:00pm with an arrival time of 10:30a.  All instructions were reviewed with the patient and she verbalizes understanding.

## 2019-08-08 ENCOUNTER — TELEPHONE (OUTPATIENT)
Dept: INTERNAL MEDICINE | Facility: CLINIC | Age: 84
End: 2019-08-08

## 2019-08-08 NOTE — TELEPHONE ENCOUNTER
Called to check on pt to see if she ever went to the emergency room and daughter Geri stated the pt did not want to go. Pt daughter then stated she contacted the cardiologist Dr. Holt's office and informed them of the pt's symptoms. Instructed pt daughter to call our office for any further concerns. Pt daughter verbalized understanding.

## 2019-08-08 NOTE — TELEPHONE ENCOUNTER
Pt caregiver called to express concern of pt having chest pain for two months and stated pt isn't looking good. Nurse instructed pt to go to the nearest emergency room. Pt caregiver stated they are located in [place]. Nurse instructed caregiver to bring pt to the [place] ED and caregiver stated she will take pt to the [facility] instead. Will call to check on pt.

## 2019-08-09 ENCOUNTER — TELEPHONE (OUTPATIENT)
Dept: CARDIOLOGY | Facility: CLINIC | Age: 84
End: 2019-08-09

## 2019-08-09 NOTE — TELEPHONE ENCOUNTER
----- Message from Sarah Levy sent at 8/9/2019 12:27 PM CDT -----  Contact: PATIENT  CALLING TO LEAVE PH# OF HER SISTER FOR PATIENT TO BE CALLED PRE SURGERY. PLEASE CALL PEREZ @ [phone]. PLEASE CALL INSTEAD OF CALLING KEVIN WHICH [place]. THANKS, VICKIE

## 2019-08-19 ENCOUNTER — HOSPITAL ENCOUNTER (OUTPATIENT)
Facility: HOSPITAL | Age: 84
Discharge: HOME | End: 2019-08-19
Attending: INTERNAL MEDICINE | Admitting: INTERNAL MEDICINE
Payer: MEDICARE

## 2019-08-19 VITALS
BODY MASS INDEX: 29.02 KG/M2 | DIASTOLIC BLOOD PRESSURE: 68 MMHG | TEMPERATURE: 98 F | WEIGHT: 170 LBS | RESPIRATION RATE: 18 BRPM | HEART RATE: 58 BPM | OXYGEN SATURATION: 97 % | SYSTOLIC BLOOD PRESSURE: 121 MMHG | HEIGHT: 64 IN

## 2019-08-19 DIAGNOSIS — R07.9 CHEST PAIN, UNSPECIFIED TYPE: ICD-10-CM

## 2019-08-19 DIAGNOSIS — R07.9 CHEST PAIN: ICD-10-CM

## 2019-08-19 DIAGNOSIS — R94.39 ABNORMAL STRESS TEST: Primary | ICD-10-CM

## 2019-08-19 DIAGNOSIS — Z01.818 PRE-OP EXAM: ICD-10-CM

## 2019-08-19 PROCEDURE — 93010 EKG 12-LEAD: ICD-10-PCS | Mod: ,,, | Performed by: INTERNAL MEDICINE

## 2019-08-19 PROCEDURE — 99152 MOD SED SAME PHYS/QHP 5/>YRS: CPT

## 2019-08-19 PROCEDURE — 93458 CATH LAB PROCEDURE: ICD-10-PCS | Mod: 26,,, | Performed by: INTERNAL MEDICINE

## 2019-08-19 PROCEDURE — 93458 L HRT ARTERY/VENTRICLE ANGIO: CPT | Mod: 26,,, | Performed by: INTERNAL MEDICINE

## 2019-08-19 PROCEDURE — 25500020 PHARM REV CODE 255

## 2019-08-19 PROCEDURE — C1769 GUIDE WIRE: HCPCS

## 2019-08-19 PROCEDURE — 63600175 PHARM REV CODE 636 W HCPCS

## 2019-08-19 PROCEDURE — 93005 ELECTROCARDIOGRAM TRACING: CPT

## 2019-08-19 PROCEDURE — 93458 L HRT ARTERY/VENTRICLE ANGIO: CPT

## 2019-08-19 PROCEDURE — 25000003 PHARM REV CODE 250

## 2019-08-19 PROCEDURE — 99152 CATH LAB PROCEDURE: ICD-10-PCS | Mod: ,,, | Performed by: INTERNAL MEDICINE

## 2019-08-19 PROCEDURE — 99152 MOD SED SAME PHYS/QHP 5/>YRS: CPT | Mod: ,,, | Performed by: INTERNAL MEDICINE

## 2019-08-19 PROCEDURE — 93010 ELECTROCARDIOGRAM REPORT: CPT | Mod: ,,, | Performed by: INTERNAL MEDICINE

## 2019-08-19 RX ORDER — SODIUM CHLORIDE 9 MG/ML
INJECTION, SOLUTION INTRAVENOUS CONTINUOUS
Status: DISCONTINUED | OUTPATIENT
Start: 2019-08-19 | End: 2019-08-19 | Stop reason: HOSPADM

## 2019-08-19 RX ORDER — DIPHENHYDRAMINE HCL 50 MG
50 CAPSULE ORAL ONCE
Status: COMPLETED | OUTPATIENT
Start: 2019-08-19 | End: 2019-08-19

## 2019-08-19 RX ORDER — NAPROXEN SODIUM 220 MG/1
81 TABLET, FILM COATED ORAL ONCE
Status: COMPLETED | OUTPATIENT
Start: 2019-08-19 | End: 2019-08-19

## 2019-08-19 RX ORDER — ISOSORBIDE MONONITRATE 30 MG/1
30 TABLET, EXTENDED RELEASE ORAL DAILY
COMMUNITY

## 2019-08-19 RX ADMIN — NAPROXEN SODIUM 81 MG: 220 TABLET, FILM COATED ORAL at 10:08

## 2019-08-19 RX ADMIN — SODIUM CHLORIDE: 9 INJECTION, SOLUTION INTRAVENOUS at 11:08

## 2019-08-19 RX ADMIN — Medication 50 MG: at 10:08

## 2019-08-19 NOTE — ASSESSMENT & PLAN NOTE
HAS MIXED PICTURE BUT HAS ACCELERATED SYMPTOMS WITH EXERTION HAVING TO AVOID PHYSICAL ACTIVITY AND TAKING NITRATES FOR PAIN RELIEF.

## 2019-08-19 NOTE — PLAN OF CARE
1600 VSS  DIET TAKEN 100% DISCHARGE INSTRUCTIONS GIVEN TO PT AND DAUGHTER. AMB IN UNIT LT WRIST DRESSING DRY WITH WRIST IMMOBILIZER ON CM AND PIV DC'D REDRESSED 1630 DISCHARGED PER W/C WITH BELONGINGS ACCOMPANIED BY THIS RN AND FAMILY.

## 2019-08-19 NOTE — HPI
An 87 yo female with htn costochondritis is referred from DR OLSEN for coronary angiography due recurrent episode of anterior chest pain occurring at rest and with exertion radiating to the left arm and neck relieved by rest and cessation of activity.has no nocturnal symptoms she snores at nite .has no leg swelling no bleeding or bruising.SHE AHS  NORMAL LVF AND POSITIVE CARDIOLITE POSTERIORELY.

## 2019-08-19 NOTE — BRIEF OP NOTE
Date: 08/19/2019  Surgeon/Physician: Katrin Camp MD  Assistants: NONE    Pre Op Diagnosis:CHEST PAIN    Post OP Diagnosis: CYHEST PAIN NORMAL CORONARIES    Procedure Performed: [facility]     ANESTHESIA:RN IV SEDATION    COMPLICATION: NONE    Specimen / Tissue Removed: No    Estimated Blood Loss: <50 cc    Prostetics/Devices: None    Findings / Operative Note:   NORMAL CORONARIES    NORMAL LVF  PLAN:  REASSURE       Discharge Note  Short Stay      SUMMARY     Admit Date: 8/19/2019    Attending Physician: Ozzie Camp MD     Discharge Physician: Katrin Camp MD     Discharge Date: 8/19/2019    Final Diagnosis: CHEST PAIN NORMAL CORONARIES Abnormal stress test.    Outcome of Stay:patient tolerated procedure well no complication has normal coronaries she has normal lvf .she will continue medical therapy and will follow up with DR OLSEN.    Disposition: Home or Self Care    Patient Instructions:   Current Discharge Medication List      CONTINUE these medications which have NOT CHANGED    Details   aspirin (ECOTRIN) 81 MG EC tablet Take 81 mg by mouth.      aspirin-acetaminophen-caffeine 250-250-65 mg (EXCEDRIN MIGRAINE) 250-250-65 mg per tablet Take 1 tablet by mouth every 6 (six) hours as needed for Pain.      diclofenac sodium (VOLTAREN) 1 % Gel Apply 2 g topically once daily.  Qty: 100 g, Refills: 2      isosorbide mononitrate (IMDUR) 30 MG 24 hr tablet Take 30 mg by mouth once daily.      lisinopril-hydrochlorothiazide (PRINZIDE,ZESTORETIC) 10-12.5 mg per tablet TAKE 1 TABLET BY MOUTH ONCE DAILY  Qty: 90 tablet, Refills: 0      methylPREDNISolone (MEDROL DOSEPACK) 4 mg tablet use as directed  Qty: 1 Package, Refills: 0      cycloSPORINE (RESTASIS) 0.05 % ophthalmic emulsion Place 0.4 mLs (1 drop total) into both eyes 2 (two) times daily.  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, bilateral             Discharge Procedure Orders (must include  Diet, Follow-up, Activity)   Discharge Procedure Orders (must include Diet, Follow-up, Activity)   Diet general     Call MD for:  temperature >100.4     Call MD for:  persistent nausea and vomiting     Call MD for:  severe uncontrolled pain     Call MD for:  difficulty breathing, headache or visual disturbances     Call MD for:  redness, tenderness, or signs of infection (pain, swelling, redness, odor or green/yellow discharge around incision site)     Call MD for:  hives     Call MD for:  persistent dizziness or light-headedness     Call MD for:  extreme fatigue     Follow-up Information     Luther Holt MD In 1 week.    Specialty:  Cardiology  Contact information:  [address] [facility] [id number] [phone]

## 2019-08-19 NOTE — SUBJECTIVE & OBJECTIVE
Past Medical History:   Diagnosis Date    Arthritis     Community acquired pneumonia 12/28/2018    Hypertension     Macular degeneration     PNA (pneumonia)        Past Surgical History:   Procedure Laterality Date    CATARACT EXTRACTION W/  INTRAOCULAR LENS IMPLANT Left 03/09/2017    CATARACT EXTRACTION W/  INTRAOCULAR LENS IMPLANT Right     EYE SURGERY      HYSTERECTOMY      PCIOL  Bilateral OD 10 YEARS AGO/OS 03/09/17    DR. CONCEPCION DID CAT. SX. OS ONLY       Review of patient's allergies indicates:   Allergen Reactions    Codeine        No current facility-administered medications on file prior to encounter.      Current Outpatient Medications on File Prior to Encounter   Medication Sig    aspirin (ECOTRIN) 81 MG EC tablet Take 81 mg by mouth.    aspirin-acetaminophen-caffeine 250-250-65 mg (EXCEDRIN MIGRAINE) 250-250-65 mg per tablet Take 1 tablet by mouth every 6 (six) hours as needed for Pain.    diclofenac sodium (VOLTAREN) 1 % Gel Apply 2 g topically once daily.    lisinopril-hydrochlorothiazide (PRINZIDE,ZESTORETIC) 10-12.5 mg per tablet TAKE 1 TABLET BY MOUTH ONCE DAILY    methylPREDNISolone (MEDROL DOSEPACK) 4 mg tablet use as directed    cycloSPORINE (RESTASIS) 0.05 % ophthalmic emulsion Place 0.4 mLs (1 drop total) into both eyes 2 (two) times daily.     Family History     None        Tobacco Use    Smoking status: Never Smoker    Smokeless tobacco: Never Used   Substance and Sexual Activity    Alcohol use: No    Drug use: No    Sexual activity: Never     Review of Systems   Constitution: Negative for diaphoresis, malaise/fatigue and weight gain.   HENT: Negative for hoarse voice.    Eyes: Negative for double vision and visual disturbance.   Cardiovascular: Positive for chest pain. Negative for claudication, cyanosis, dyspnea on exertion, irregular heartbeat, leg swelling, near-syncope, orthopnea, palpitations, paroxysmal nocturnal dyspnea and syncope.   Respiratory: Positive for  shortness of breath and snoring. Negative for cough and hemoptysis.    Hematologic/Lymphatic: Negative for bleeding problem. Does not bruise/bleed easily.   Skin: Negative for color change and poor wound healing.   Musculoskeletal: Negative for muscle cramps, muscle weakness and myalgias.   Gastrointestinal: Negative for bloating, abdominal pain, change in bowel habit, diarrhea, heartburn, hematemesis, hematochezia, melena and nausea.   Neurological: Negative for excessive daytime sleepiness, dizziness, headaches, light-headedness, loss of balance, numbness and weakness.   Psychiatric/Behavioral: Negative for memory loss. The patient does not have insomnia.    Allergic/Immunologic: Negative for hives.     Objective:     Vital Signs (Most Recent):  Temp: 97.7 °F (36.5 °C) (08/19/19 1030)  Pulse: 77 (08/19/19 1030)  Resp: 18 (08/19/19 1030)  BP: (!) 158/76 (08/19/19 1030)  SpO2: 98 % (08/19/19 1030) Vital Signs (24h Range):  Temp:  [97.7 °F (36.5 °C)] 97.7 °F (36.5 °C)  Pulse:  [77] 77  Resp:  [18] 18  SpO2:  [98 %] 98 %  BP: (158)/(76) 158/76     Weight: 77.1 kg (170 lb)  Body mass index is 29.18 kg/m².    SpO2: 98 %       No intake or output data in the 24 hours ending 08/19/19 1157    Lines/Drains/Airways     Peripheral Intravenous Line                 Peripheral IV - Single Lumen 08/19/19 1100 22 G;1 in Right Hand less than 1 day                Physical Exam   Constitutional: She is oriented to person, place, and time. She appears well-developed and well-nourished. She does not appear ill. No distress.   HENT:   Head: Normocephalic and atraumatic.   Eyes: Pupils are equal, round, and reactive to light. EOM are normal. No scleral icterus.   Neck: Normal range of motion. Neck supple. Normal carotid pulses, no hepatojugular reflux and no JVD present. Carotid bruit is not present. No tracheal deviation present. No thyromegaly present.   Cardiovascular: Normal rate, regular rhythm, normal heart sounds, intact distal  pulses and normal pulses. Exam reveals no gallop and no friction rub.   No murmur heard.  Pulmonary/Chest: Effort normal and breath sounds normal. No respiratory distress. She has no wheezes. She has no rhonchi. She has no rales. She exhibits no tenderness.   Abdominal: Soft. Normal appearance, normal aorta and bowel sounds are normal. She exhibits no abdominal bruit, no ascites and no pulsatile midline mass. There is no hepatomegaly. There is no tenderness.   Musculoskeletal: She exhibits no edema.        Right shoulder: She exhibits no deformity.   Neurological: She is alert and oriented to person, place, and time. She has normal strength. No cranial nerve deficit. Coordination normal.   Skin: Skin is warm and dry. No rash noted. No cyanosis or erythema. Nails show no clubbing.   Psychiatric: She has a normal mood and affect. Her speech is normal and behavior is normal.       Significant Labs:   Lab Results   Component Value Date    CHOL 253 (H) 06/12/2019    CHOL 237 (H) 05/17/2018    CHOL 265 (H) 10/06/2016     Lab Results   Component Value Date    HDL 75 06/12/2019    HDL 75 05/17/2018    HDL 66 10/06/2016     Lab Results   Component Value Date    LDLCALC 150.0 06/12/2019    LDLCALC 139.6 05/17/2018    LDLCALC 154.8 10/06/2016     Lab Results   Component Value Date    TRIG 140 06/12/2019    TRIG 112 05/17/2018    TRIG 221 (H) 10/06/2016     Lab Results   Component Value Date    CHOLHDL 29.6 06/12/2019    CHOLHDL 31.6 05/17/2018    CHOLHDL 24.9 10/06/2016     [unfilled]  Lab Results   Component Value Date    TSH 2.323 06/12/2019     No results found for: INR, PROTIME  Lab Results   Component Value Date    WBC 6.30 07/18/2019    HGB 11.6 (L) 07/18/2019    HCT 35.3 (L) 07/18/2019    MCV 94 07/18/2019     07/18/2019        Significant Imaging: reviewed imaging by DR OLSEN.

## 2019-08-19 NOTE — H&P
[facility] -   Cardiology  History and Physical     Patient Name: Josephine Squires  MRN: [id number]  Admission Date: 8/19/2019  Code Status: No Order   Attending Provider: Ozzie Camp MD   Primary Care Physician: Tuan Felder DO  Principal Problem:<principal problem not specified>    Patient information was obtained from patient and ER records.     Subjective:     Chief Complaint:  RECURRENT LIMITING LEFT SIDED CHEST PAIN WITH ABNORMAL CARDIOLITE     HPI:  An 87 yo female with htn costochondritis is referred from DR OLSEN for coronary angiography due recurrent episode of anterior chest pain occurring at rest and with exertion radiating to the left arm and neck relieved by rest and cessation of activity.has no nocturnal symptoms she snores at nite .has no leg swelling no bleeding or bruising.SHE AHS  NORMAL LVF AND POSITIVE CARDIOLITE POSTERIORELY.    Past Medical History:   Diagnosis Date    Arthritis     Community acquired pneumonia 12/28/2018    Hypertension     Macular degeneration     PNA (pneumonia)        Past Surgical History:   Procedure Laterality Date    CATARACT EXTRACTION W/  INTRAOCULAR LENS IMPLANT Left 03/09/2017    CATARACT EXTRACTION W/  INTRAOCULAR LENS IMPLANT Right     EYE SURGERY      HYSTERECTOMY      PCIOL  Bilateral OD 10 YEARS AGO/OS 03/09/17    DR. CONCEPCION DID CAT. SX. OS ONLY       Review of patient's allergies indicates:   Allergen Reactions    Codeine        No current facility-administered medications on file prior to encounter.      Current Outpatient Medications on File Prior to Encounter   Medication Sig    aspirin (ECOTRIN) 81 MG EC tablet Take 81 mg by mouth.    aspirin-acetaminophen-caffeine 250-250-65 mg (EXCEDRIN MIGRAINE) 250-250-65 mg per tablet Take 1 tablet by mouth every 6 (six) hours as needed for Pain.    diclofenac sodium (VOLTAREN) 1 % Gel Apply 2 g topically once daily.    lisinopril-hydrochlorothiazide (PRINZIDE,ZESTORETIC)  10-12.5 mg per tablet TAKE 1 TABLET BY MOUTH ONCE DAILY    methylPREDNISolone (MEDROL DOSEPACK) 4 mg tablet use as directed    cycloSPORINE (RESTASIS) 0.05 % ophthalmic emulsion Place 0.4 mLs (1 drop total) into both eyes 2 (two) times daily.     Family History     None        Tobacco Use    Smoking status: Never Smoker    Smokeless tobacco: Never Used   Substance and Sexual Activity    Alcohol use: No    Drug use: No    Sexual activity: Never     Review of Systems   Constitution: Negative for diaphoresis, malaise/fatigue and weight gain.   HENT: Negative for hoarse voice.    Eyes: Negative for double vision and visual disturbance.   Cardiovascular: Positive for chest pain. Negative for claudication, cyanosis, dyspnea on exertion, irregular heartbeat, leg swelling, near-syncope, orthopnea, palpitations, paroxysmal nocturnal dyspnea and syncope.   Respiratory: Positive for shortness of breath and snoring. Negative for cough and hemoptysis.    Hematologic/Lymphatic: Negative for bleeding problem. Does not bruise/bleed easily.   Skin: Negative for color change and poor wound healing.   Musculoskeletal: Negative for muscle cramps, muscle weakness and myalgias.   Gastrointestinal: Negative for bloating, abdominal pain, change in bowel habit, diarrhea, heartburn, hematemesis, hematochezia, melena and nausea.   Neurological: Negative for excessive daytime sleepiness, dizziness, headaches, light-headedness, loss of balance, numbness and weakness.   Psychiatric/Behavioral: Negative for memory loss. The patient does not have insomnia.    Allergic/Immunologic: Negative for hives.     Objective:     Vital Signs (Most Recent):  Temp: 97.7 °F (36.5 °C) (08/19/19 1030)  Pulse: 77 (08/19/19 1030)  Resp: 18 (08/19/19 1030)  BP: (!) 158/76 (08/19/19 1030)  SpO2: 98 % (08/19/19 1030) Vital Signs (24h Range):  Temp:  [97.7 °F (36.5 °C)] 97.7 °F (36.5 °C)  Pulse:  [77] 77  Resp:  [18] 18  SpO2:  [98 %] 98 %  BP: (158)/(76) 158/76      Weight: 77.1 kg (170 lb)  Body mass index is 29.18 kg/m².    SpO2: 98 %       No intake or output data in the 24 hours ending 08/19/19 1157    Lines/Drains/Airways     Peripheral Intravenous Line                 Peripheral IV - Single Lumen 08/19/19 1100 22 G;1 in Right Hand less than 1 day                Physical Exam   Constitutional: She is oriented to person, place, and time. She appears well-developed and well-nourished. She does not appear ill. No distress.   HENT:   Head: Normocephalic and atraumatic.   Eyes: Pupils are equal, round, and reactive to light. EOM are normal. No scleral icterus.   Neck: Normal range of motion. Neck supple. Normal carotid pulses, no hepatojugular reflux and no JVD present. Carotid bruit is not present. No tracheal deviation present. No thyromegaly present.   Cardiovascular: Normal rate, regular rhythm, normal heart sounds, intact distal pulses and normal pulses. Exam reveals no gallop and no friction rub.   No murmur heard.  Pulmonary/Chest: Effort normal and breath sounds normal. No respiratory distress. She has no wheezes. She has no rhonchi. She has no rales. She exhibits no tenderness.   Abdominal: Soft. Normal appearance, normal aorta and bowel sounds are normal. She exhibits no abdominal bruit, no ascites and no pulsatile midline mass. There is no hepatomegaly. There is no tenderness.   Musculoskeletal: She exhibits no edema.        Right shoulder: She exhibits no deformity.   Neurological: She is alert and oriented to person, place, and time. She has normal strength. No cranial nerve deficit. Coordination normal.   Skin: Skin is warm and dry. No rash noted. No cyanosis or erythema. Nails show no clubbing.   Psychiatric: She has a normal mood and affect. Her speech is normal and behavior is normal.       Significant Labs:   Lab Results   Component Value Date    CHOL 253 (H) 06/12/2019    CHOL 237 (H) 05/17/2018    CHOL 265 (H) 10/06/2016     Lab Results   Component Value  Date    HDL 75 06/12/2019    HDL 75 05/17/2018    HDL 66 10/06/2016     Lab Results   Component Value Date    LDLCALC 150.0 06/12/2019    LDLCALC 139.6 05/17/2018    LDLCALC 154.8 10/06/2016     Lab Results   Component Value Date    TRIG 140 06/12/2019    TRIG 112 05/17/2018    TRIG 221 (H) 10/06/2016     Lab Results   Component Value Date    CHOLHDL 29.6 06/12/2019    CHOLHDL 31.6 05/17/2018    CHOLHDL 24.9 10/06/2016     [unfilled]  Lab Results   Component Value Date    TSH 2.323 06/12/2019     No results found for: INR, PROTIME  Lab Results   Component Value Date    WBC 6.30 07/18/2019    HGB 11.6 (L) 07/18/2019    HCT 35.3 (L) 07/18/2019    MCV 94 07/18/2019     07/18/2019        Significant Imaging: reviewed imaging by DR OLSEN.    Assessment and Plan:     Abnormal stress test  HAS A POSITIVE CARDIOLITE WITH MODERATE ISCHEMIA IN THE POSTERIOR WALL     Chest pain  HAS MIXED PICTURE BUT HAS ACCELERATED SYMPTOMS WITH EXERTION HAVING TO AVOID PHYSICAL ACTIVITY AND TAKING NITRATES FOR PAIN RELIEF.    Hypertension  ON MEDICAL THERAPY         VTE Risk Mitigation (From admission, onward)    None        LHC VIA LEFT RADIAL APPROACH /PTCA   I have explained the risks, benefits , and alternatives of the procedure in detail.the patient voices understanding and all questions have been answered.the patient agrees to proceed as planned.  Katrin Camp MD  Cardiology   [facility] [place]

## 2019-08-21 ENCOUNTER — TELEPHONE (OUTPATIENT)
Dept: INTERNAL MEDICINE | Facility: CLINIC | Age: 84
End: 2019-08-21

## 2019-08-21 NOTE — TELEPHONE ENCOUNTER
Patient grand daughter called in wanting to schedule her grandmother an appointment to be seen for her chest pain. She reports that the pain is on and off. The patient recently had a heart cath on 8/19/19 and reported to her grand daughter that she is still experiencing the same on and off pain since before she had the heart cath preformed. LOV: 07/22/19.      Please Advise

## 2019-08-21 NOTE — TELEPHONE ENCOUNTER
----- Message from Meghann Hein sent at 8/21/2019  1:37 PM CDT -----  Contact: Negrita Pathak  Type:  Sooner Apoointment Request    Caller is requesting a sooner appointment.  Caller declined first available appointment listed below.  Caller will not accept being placed on the waitlist and is requesting a message be sent to doctor.  Name of Caller: Negrita  When is the first available appointment? 09/09/2019  Symptoms: chest pain  Would the patient rather a call back or a response via [facility]? Call back  Best Call Back Number: [phone]  Additional Information: n/a

## 2019-08-26 ENCOUNTER — HOSPITAL ENCOUNTER (OUTPATIENT)
Dept: RADIOLOGY | Facility: HOSPITAL | Age: 84
Discharge: HOME | End: 2019-08-26
Attending: NURSE PRACTITIONER
Payer: MEDICARE

## 2019-08-26 ENCOUNTER — OFFICE VISIT (OUTPATIENT)
Dept: INTERNAL MEDICINE | Facility: CLINIC | Age: 84
End: 2019-08-26
Payer: MEDICARE

## 2019-08-26 VITALS
OXYGEN SATURATION: 97 % | SYSTOLIC BLOOD PRESSURE: 126 MMHG | RESPIRATION RATE: 16 BRPM | DIASTOLIC BLOOD PRESSURE: 74 MMHG | HEART RATE: 90 BPM | WEIGHT: 170.88 LBS | HEIGHT: 64 IN | BODY MASS INDEX: 29.17 KG/M2 | TEMPERATURE: 97 F

## 2019-08-26 DIAGNOSIS — R07.9 CHEST PAIN, UNSPECIFIED TYPE: ICD-10-CM

## 2019-08-26 DIAGNOSIS — M54.2 NECK PAIN: ICD-10-CM

## 2019-08-26 DIAGNOSIS — R07.9 CHEST PAIN, UNSPECIFIED TYPE: Primary | ICD-10-CM

## 2019-08-26 PROCEDURE — 99214 PR OFFICE/OUTPT VISIT, EST, LEVL IV, 30-39 MIN: ICD-10-PCS | Mod: S$PBB,,, | Performed by: NURSE PRACTITIONER

## 2019-08-26 PROCEDURE — 99999 PR PBB SHADOW E&M-EST. PATIENT-LVL IV: CPT | Mod: PBBFAC,,, | Performed by: NURSE PRACTITIONER

## 2019-08-26 PROCEDURE — 71260 CT THORAX DX C+: CPT | Mod: 26,,, | Performed by: RADIOLOGY

## 2019-08-26 PROCEDURE — 99214 OFFICE O/P EST MOD 30 MIN: CPT | Mod: PBBFAC,25,PO | Performed by: NURSE PRACTITIONER

## 2019-08-26 PROCEDURE — 99214 OFFICE O/P EST MOD 30 MIN: CPT | Mod: S$PBB,,, | Performed by: NURSE PRACTITIONER

## 2019-08-26 PROCEDURE — 71260 CT CHEST WITH CONTRAST: ICD-10-PCS | Mod: 26,,, | Performed by: RADIOLOGY

## 2019-08-26 PROCEDURE — 99999 PR PBB SHADOW E&M-EST. PATIENT-LVL IV: ICD-10-PCS | Mod: PBBFAC,,, | Performed by: NURSE PRACTITIONER

## 2019-08-26 PROCEDURE — 25500020 PHARM REV CODE 255: Mod: PO | Performed by: NURSE PRACTITIONER

## 2019-08-26 PROCEDURE — 71260 CT THORAX DX C+: CPT | Mod: TC,PO

## 2019-08-26 RX ORDER — MELOXICAM 7.5 MG/1
7.5 TABLET ORAL DAILY
Qty: 30 TABLET | Refills: 0 | Status: SHIPPED | OUTPATIENT
Start: 2019-08-26

## 2019-08-26 RX ADMIN — IOHEXOL 75 ML: 350 INJECTION, SOLUTION INTRAVENOUS at 12:08

## 2019-08-26 NOTE — PROGRESS NOTES
Subjective:       Patient ID: Josephine Squires is a 88 y.o. female.    Chief Complaint: Chest Pain    Mrs. Squires is here today c/o intemittent L sided chest pain that has been on and off for approx 2 months now. She has had negative cardiac catheterization, CXR, and echo. She had a cardiac cath on 8/19/2019 with [facility] with normal results, and had labs, stress test, and echo performed at [facility] that was ordered by her cardiologist, Dr. Holt. Dr. Holt started her on 0.5 tablet of 30 mg Imdur approx 3 weeks, and she does find that this helps with chest pain also. She will see Dr. Holt today at 3 pm following a carotid US. Prior medication tx includes steroid pack on 7/22/2019 for costochondritis with mild relief, but symptoms returned, and rotating tylenol and excedrin. She report pain is comes and goes without warning or pattern. Worsens with lying on L side and rotation. She took her imdur this morning, but did not take tylenol, ibuprofen, or excedrin for pain relief. Daughter requesting CT scan since she has already ruled out a lot of cardiac causes.     Chest Pain    This is a new problem. The current episode started more than 1 month ago (2 months). The problem occurs intermittently. The problem has been waxing and waning. The pain is present in the substernal region and epigastric region (L side radiates up to L side of neck). The pain is at a severity of 0/10. The quality of the pain is described as tightness (deep, sometimes soreness). The pain radiates to the left neck and upper back (L side). Associated symptoms include back pain (intermittent upper back with chest pain), a cough (infrequent), dizziness (intermittent), headaches, malaise/fatigue, nausea (intermittent with severe pain), palpitations and shortness of breath (only with CP). Pertinent negatives include no abdominal pain, claudication, diaphoresis, exertional chest pressure, fever, hemoptysis, irregular heartbeat, leg  pain, lower extremity edema, numbness, orthopnea, PND, sputum production, syncope, vomiting or weakness. Prior diagnostic workup includes cardiac catheterization, echocardiogram and chest x-ray.       Patient Active Problem List   Diagnosis    Hypertension    Hyperlipidemia    GERD (gastroesophageal reflux disease)    Costochondritis    Abnormal stress test    Chest pain    Pulmonary nodule       History reviewed. No pertinent family history.  Past Surgical History:   Procedure Laterality Date    CATARACT EXTRACTION W/  INTRAOCULAR LENS IMPLANT Left 03/09/2017    CATARACT EXTRACTION W/  INTRAOCULAR LENS IMPLANT Right     CATHETERIZATION, HEART, LEFT-[place] pt Left 8/19/2019    Performed by Ozzie Camp MD at [facility] C[facility]    EYE SURGERY      HYSTERECTOMY      PCIOL  Bilateral OD 10 YEARS AGO/OS 03/09/17    DR. CONCEPCION DID CAT. SX. OS ONLY         Current Outpatient Medications:     aspirin (ECOTRIN) 81 MG EC tablet, Take 81 mg by mouth., Disp: , Rfl:     aspirin-acetaminophen-caffeine 250-250-65 mg (EXCEDRIN MIGRAINE) 250-250-65 mg per tablet, Take 1 tablet by mouth every 6 (six) hours as needed for Pain., Disp: , Rfl:     diclofenac sodium (VOLTAREN) 1 % Gel, Apply 2 g topically once daily., Disp: 100 g, Rfl: 2    isosorbide mononitrate (IMDUR) 30 MG 24 hr tablet, Take 30 mg by mouth once daily. Patient taking 1/2 tablet daily, Disp: , Rfl:     lisinopril-hydrochlorothiazide (PRINZIDE,ZESTORETIC) 10-12.5 mg per tablet, TAKE 1 TABLET BY MOUTH ONCE DAILY, Disp: 90 tablet, Rfl: 0    cycloSPORINE (RESTASIS) 0.05 % ophthalmic emulsion, Place 0.4 mLs (1 drop total) into both eyes 2 (two) times daily., Disp: 180 vial, Rfl: 3    meloxicam (MOBIC) 7.5 MG tablet, Take 1 tablet (7.5 mg total) by mouth once daily., Disp: 30 tablet, Rfl: 0    Review of Systems   Constitutional: Positive for activity change, fatigue and malaise/fatigue. Negative for appetite change, chills, diaphoresis and fever.   HENT:  "Negative for congestion, postnasal drip, rhinorrhea, sinus pain, trouble swallowing and voice change.    Eyes: Negative for photophobia and visual disturbance.   Respiratory: Positive for cough (infrequent) and shortness of breath (only with CP). Negative for apnea, hemoptysis, sputum production, chest tightness and wheezing.    Cardiovascular: Positive for chest pain and palpitations. Negative for orthopnea, claudication, leg swelling, syncope and PND.   Gastrointestinal: Positive for nausea (intermittent with severe pain). Negative for abdominal distention, abdominal pain, constipation, diarrhea and vomiting.   Musculoskeletal: Positive for back pain (intermittent upper back with chest pain) and neck pain (L side of neck). Negative for gait problem and joint swelling.   Skin: Negative for color change, pallor and rash.   Neurological: Positive for dizziness (intermittent) and headaches. Negative for weakness, light-headedness and numbness.       Objective:   /74 (BP Location: Right arm, Patient Position: Sitting, BP Method: Large (Manual))   Pulse 90   Temp 97 °F (36.1 °C) (Tympanic)   Resp 16   Ht 5' 4" (1.626 m)   Wt 77.5 kg (170 lb 13.7 oz)   SpO2 97%   BMI 29.33 kg/m²      Physical Exam   Constitutional: She is oriented to person, place, and time. She appears well-developed and well-nourished. She is cooperative. She does not appear ill. No distress.   HENT:   Head: Normocephalic and atraumatic.   Neck: Normal range of motion and full passive range of motion without pain. Neck supple. Muscular tenderness present. No neck rigidity. No edema, no erythema and normal range of motion present.   Cardiovascular: Normal rate, regular rhythm, normal heart sounds and intact distal pulses. Exam reveals no gallop and no friction rub.   No murmur heard.  Pulmonary/Chest: Effort normal and breath sounds normal. No respiratory distress. She has no wheezes. She has no rales. She exhibits tenderness (L side of " mid/upper chest wall TTP).   Abdominal: Soft. Bowel sounds are normal. There is no tenderness.   Musculoskeletal: Normal range of motion. She exhibits tenderness (Lateral and posterior L side of neck TTP). She exhibits no edema or deformity.   Neurological: She is alert and oriented to person, place, and time. No cranial nerve deficit.   Skin: Skin is warm and dry. Capillary refill takes less than 2 seconds. No rash noted. She is not diaphoretic. No erythema. No pallor.   Psychiatric: She has a normal mood and affect. Her behavior is normal.   Vitals reviewed.      Assessment & Plan     Problem List Items Addressed This Visit        Other    Chest pain - Primary  Seems MS with negative cardiac work up, and TTP upon exam. Will do CT scan to rule out other causes and have patient continue to FU with [facility] as scheduled.   Mobic sent for trial for pain relief to see if anti-inflammatory relieves musculoskeletal pain.     Relevant Orders    Creatinine, serum (Completed)    CT Chest With Contrast (Completed)      Other Visit Diagnoses     Neck pain        Discussed neck stretches for muscle tenderness and muscle pain of neck.   Mobic sent for pain relief.     Relevant Medications    meloxicam (MOBIC) 7.5 MG tablet           Follow up according to CT results, and if symptoms continue without relief. .

## 2019-08-27 PROBLEM — R91.1 PULMONARY NODULE: Status: ACTIVE | Noted: 2019-08-27

## 2019-10-04 RX ORDER — LISINOPRIL AND HYDROCHLOROTHIAZIDE 10; 12.5 MG/1; MG/1
TABLET ORAL
Qty: 90 TABLET | Refills: 0 | OUTPATIENT
Start: 2019-10-04

## 2019-10-18 RX ORDER — LISINOPRIL AND HYDROCHLOROTHIAZIDE 10; 12.5 MG/1; MG/1
TABLET ORAL
Qty: 90 TABLET | Refills: 0 | Status: SHIPPED | OUTPATIENT
Start: 2019-10-18 | End: 2020-03-24

## 2019-12-10 ENCOUNTER — OFFICE VISIT (OUTPATIENT)
Dept: INTERNAL MEDICINE | Facility: CLINIC | Age: 84
End: 2019-12-10
Payer: MEDICARE

## 2019-12-10 VITALS
WEIGHT: 173.75 LBS | TEMPERATURE: 98 F | HEIGHT: 64 IN | DIASTOLIC BLOOD PRESSURE: 72 MMHG | BODY MASS INDEX: 29.66 KG/M2 | OXYGEN SATURATION: 98 % | SYSTOLIC BLOOD PRESSURE: 120 MMHG | HEART RATE: 73 BPM

## 2019-12-10 DIAGNOSIS — R29.898 LEFT LEG WEAKNESS: ICD-10-CM

## 2019-12-10 DIAGNOSIS — I77.1 STRICTURE OF ARTERY: ICD-10-CM

## 2019-12-10 DIAGNOSIS — Z91.81 AT RISK FOR FALLING: Primary | ICD-10-CM

## 2019-12-10 PROBLEM — R94.39 ABNORMAL STRESS TEST: Status: RESOLVED | Noted: 2019-08-19 | Resolved: 2019-12-10

## 2019-12-10 PROCEDURE — 1126F PR PAIN SEVERITY QUANTIFIED, NO PAIN PRESENT: ICD-10-PCS | Mod: ,,, | Performed by: FAMILY MEDICINE

## 2019-12-10 PROCEDURE — 99214 OFFICE O/P EST MOD 30 MIN: CPT | Mod: S$PBB,,, | Performed by: FAMILY MEDICINE

## 2019-12-10 PROCEDURE — 99214 PR OFFICE/OUTPT VISIT, EST, LEVL IV, 30-39 MIN: ICD-10-PCS | Mod: S$PBB,,, | Performed by: FAMILY MEDICINE

## 2019-12-10 PROCEDURE — 1159F PR MEDICATION LIST DOCUMENTED IN MEDICAL RECORD: ICD-10-PCS | Mod: ,,, | Performed by: FAMILY MEDICINE

## 2019-12-10 PROCEDURE — 1159F MED LIST DOCD IN RCRD: CPT | Mod: ,,, | Performed by: FAMILY MEDICINE

## 2019-12-10 PROCEDURE — 99999 PR PBB SHADOW E&M-EST. PATIENT-LVL IV: CPT | Mod: PBBFAC,,, | Performed by: FAMILY MEDICINE

## 2019-12-10 PROCEDURE — 99214 OFFICE O/P EST MOD 30 MIN: CPT | Mod: PBBFAC,PO | Performed by: FAMILY MEDICINE

## 2019-12-10 PROCEDURE — 99999 PR PBB SHADOW E&M-EST. PATIENT-LVL IV: ICD-10-PCS | Mod: PBBFAC,,, | Performed by: FAMILY MEDICINE

## 2019-12-10 PROCEDURE — 1126F AMNT PAIN NOTED NONE PRSNT: CPT | Mod: ,,, | Performed by: FAMILY MEDICINE

## 2019-12-10 NOTE — ASSESSMENT & PLAN NOTE
Has known atherosclerosis and other regions and I am concerned that she could be having some carotid stenosis since she is having intermittent numbness and tingling of her left side.

## 2019-12-10 NOTE — ASSESSMENT & PLAN NOTE
Provided with form for her to have a handicap placard and also recommended referral to physical therapy for exercises in balance and strength to prevent falls.  She may benefit from a motorized scooter to help with activities of daily living and prevent falls. A scooter would allow her to be more independent especially outside of the home. A walker would not be appropriate as her speed would still be a concern and she doesn't have sufficient upper body strength for wheelchair propulsion. Should could transfer to and from scooter without a problem and she could maintain posture for safe operating.

## 2019-12-10 NOTE — PROGRESS NOTES
Subjective:       Patient ID: Josephine Squires is a 88 y.o. female.    Chief Complaint: Fatigue (visit for mobility evaluation)    HPI    Came in today with 2 of her daughters for concerns about weakness in her lower extremities and she has had a few different falls.  She has never had any significant injury from the falls but she is starting to be more limited in her activities secondary to fear of falling.  She has a routine of going on cruises multiple times a year with family members and she has not been able to do this.  In fact, she is not able to walk more than 100-200 feet without getting tired and she is worried about falling.  She has not had any physical therapy recently.  She has fallen different times if she just tripped on something and stumbles.   She is able to perform grooming and other ADLs without a problem. However she is limited in the distance she can walk to 50ft without assistance and her pace is slow. It may take 20 seconds to walk 50 feet.     She also has been having some intermittent numbness of her left upper and left lower extremities.  Says that this comes and goes.  She recently had cardiac catheterization about 3 months ago and everything was normal.  She had been having some intermittent chest discomfort.  Has not had any carotid imaging.  No vision changes.  No dizziness.    History reviewed. No pertinent family history.    Current Outpatient Medications:     aspirin (ECOTRIN) 81 MG EC tablet, Take 81 mg by mouth., Disp: , Rfl:     aspirin-acetaminophen-caffeine 250-250-65 mg (EXCEDRIN MIGRAINE) 250-250-65 mg per tablet, Take 1 tablet by mouth every 6 (six) hours as needed for Pain., Disp: , Rfl:     cycloSPORINE (RESTASIS) 0.05 % ophthalmic emulsion, Place 0.4 mLs (1 drop total) into both eyes 2 (two) times daily., Disp: 180 vial, Rfl: 3    diclofenac sodium (VOLTAREN) 1 % Gel, Apply 2 g topically once daily., Disp: 100 g, Rfl: 2    isosorbide mononitrate (IMDUR) 30 MG 24  "hr tablet, Take 30 mg by mouth once daily. Patient taking 1/2 tablet daily, Disp: , Rfl:     lisinopril-hydrochlorothiazide (PRINZIDE,ZESTORETIC) 10-12.5 mg per tablet, TAKE 1 TABLET BY MOUTH ONCE DAILY, Disp: 90 tablet, Rfl: 0    meloxicam (MOBIC) 7.5 MG tablet, Take 1 tablet (7.5 mg total) by mouth once daily. (Patient not taking: Reported on 12/31/2019), Disp: 30 tablet, Rfl: 0    Review of Systems   Constitutional: Negative for chills and fever.   Respiratory: Negative for cough and shortness of breath.    Cardiovascular: Negative for chest pain.   Gastrointestinal: Negative for abdominal pain.   Musculoskeletal: Positive for arthralgias (and weakness of bilateral lower extremites).   Skin: Negative for rash.   Neurological: Positive for weakness. Negative for dizziness, light-headedness, numbness and headaches.       Objective:   /72 (BP Location: Right arm, Patient Position: Sitting, BP Method: Medium (Manual))   Pulse 73   Temp 97.7 °F (36.5 °C) (Tympanic)   Ht 5' 4" (1.626 m)   Wt 78.8 kg (173 lb 11.6 oz)   SpO2 98%   BMI 29.82 kg/m²      Physical Exam   Constitutional: She is oriented to person, place, and time. She appears well-developed and well-nourished. No distress.   HENT:   Head: Normocephalic and atraumatic.   Nose: Nose normal.   Eyes: Pupils are equal, round, and reactive to light. Conjunctivae and EOM are normal. Right eye exhibits no discharge. Left eye exhibits no discharge.   Neck: No thyromegaly present.   Cardiovascular: Normal rate and regular rhythm.   No murmur heard.  No carotid bruit   Pulmonary/Chest: Effort normal and breath sounds normal. No respiratory distress.   Abdominal: Soft. She exhibits no distension.   Musculoskeletal: She exhibits no edema.   LE strength is 3/5  UE strength is 3/5   Neurological: She is alert and oriented to person, place, and time.   Skin: No rash noted. She is not diaphoretic.   Psychiatric: She has a normal mood and affect. Her behavior is " normal.       Assessment & Plan     Problem List Items Addressed This Visit        Cardiac/Vascular    Stricture of artery     Current Assessment & Plan       Has known atherosclerosis and other regions and I am concerned that she could be having some carotid stenosis since she is having intermittent numbness and tingling of her left side.         Relevant Orders    US Carotid Bilateral (Completed)       Orthopedic    Left leg weakness    Relevant Orders    US Carotid Bilateral (Completed)       Other    At risk for falling - Primary    Current Assessment & Plan       Provided with form for her to have a handicap placard and also recommended referral to physical therapy for exercises in balance and strength to prevent falls.  She may benefit from a motorized scooter to help with activities of daily living and prevent falls. A scooter would allow her to be more independent especially outside of the home. A walker would not be appropriate as her speed would still be a concern and she doesn't have sufficient upper body strength for wheelchair propulsion. Should could transfer to and from scooter without a problem and she could maintain posture for safe operating.          Relevant Orders    Ambulatory Referral to Physical/Occupational Therapy    Ambulatory Referral to Outpatient Case Management    US Carotid Bilateral (Completed)            No follow-ups on file.    Disclaimer:  This note may have been prepared using voice recognition software, it may have not been extensively proofed, as such there could be errors within the text such as sound alike errors.

## 2019-12-11 ENCOUNTER — HOSPITAL ENCOUNTER (OUTPATIENT)
Dept: RADIOLOGY | Facility: HOSPITAL | Age: 84
Discharge: HOME | End: 2019-12-11
Attending: FAMILY MEDICINE
Payer: MEDICARE

## 2019-12-11 DIAGNOSIS — Z91.81 AT RISK FOR FALLING: ICD-10-CM

## 2019-12-11 DIAGNOSIS — R29.898 LEFT LEG WEAKNESS: ICD-10-CM

## 2019-12-11 DIAGNOSIS — I77.1 STRICTURE OF ARTERY: ICD-10-CM

## 2019-12-11 PROCEDURE — 93880 EXTRACRANIAL BILAT STUDY: CPT | Mod: 26,,, | Performed by: RADIOLOGY

## 2019-12-11 PROCEDURE — 93880 US CAROTID BILATERAL: ICD-10-PCS | Mod: 26,,, | Performed by: RADIOLOGY

## 2019-12-11 PROCEDURE — 93880 EXTRACRANIAL BILAT STUDY: CPT | Mod: TC,PO

## 2019-12-18 ENCOUNTER — OUTPATIENT CASE MANAGEMENT (OUTPATIENT)
Dept: ADMINISTRATIVE | Facility: OTHER | Age: 84
End: 2019-12-18

## 2019-12-18 NOTE — LETTER
December 31, 2019    Josephine Rohan Squires  [address]  [place] [address]             [facility] [address] [facility] [place] [address]  Dear Ms. Squires,                   Welcome to the [facility] program. My goal is to help you function at the healthiest and highest level possible. You can contact me directly at [phone]. Here are the educational and resource materials that I believe you may find useful. Please take your time to review them. Do not hesitate to call me for any questions or concerns.      General appointment scheduling [phone]  [facility] (nurse line) [phone]    Sincerely,     Hayley Carbajal RN   Outpatient Case Management  [facility]  [phone]

## 2019-12-31 NOTE — PATIENT INSTRUCTIONS
Fall Due to Dizziness, Weakness, or Loss of Balance  The symptoms that led to your fall have been evaluated. Your doctor feels it is safe for you to return home.  Many things can cause you to become dizzy. Everyone means a little something different by the word dizzy. People may describe their symptoms using these words:  · It doesnt feel right in my head  · It feels like spinning in my head  · It seems like the room is spinning  · My balance feels off  · I feel lightheaded, like I am going to pass out  All these descriptions can have real causes.     Your balance mechanism is in your inner ear. Anything disturbing it can make you feel dizzy, whether it is from a cold, an injury, or many other things. Anything that causes your blood pressure to drop suddenly can make you feel lightheaded, or like you are going to faint. This is because at that moment there might not be enough blood flowing to your brain. Causes include:  · Medicines  · Dehydration  · Standing up or bending over too quickly  · Becoming overheated  · Taking a hot shower or bath  · Straining hard while lifting something or using the toilet  · Strokes, heart attack, heart valve disease, very slow or very fast heart rate  · Low blood sugar  · Ear infection  · Hyperventilation  · Anemia  · Trauma  · Infection  · Panic attack  · Pregnancy  You may be at risk of repeat falls. Take precautions described below to prevent another fall.  Home care  · If you become lightheaded or dizzy, lie down immediately or sit and lean forward with your head down. It is better to do this than fall and seriously hurt or injure yourself.  · Rest today. When changing position, take a moment to be sure any dizziness goes away before standing and walking.  · If you have been prescribed a walker, be sure to use it whenever you walk, even if it is a short distance.  · If you were injured during the fall, follow the advice from your doctor regarding care of your injury.  · Be  sure your doctor knows all the medicines, herbs, and supplements you take. Some medicines can cause dizziness.  Follow-up care  Unless given other advice, call your doctor on the next office day to advise of your fall and to schedule an appointment. You may require further treatment for the underlying condition that caused todays fall.  If X-ray or a CT scan were done, you will be notified of the results, especially if it affects treatment.  Call 911  Call 911 if any of these occur:  · Trouble breathing  · Confused or difficulty arousing  · Fainting or loss of consciousness  · Rapid or very slow heart rate  · Seizure  · Difficulty with speech or vision, weakness of an arm or leg  · Difficulty walking or talking, loss of balance  · Numbness or weakness in one side of your body, facial droop  When to seek medical advice  Call your healthcare provider right away if any of the following occur:  · Another fall  · Continued dizzy spells  · Severe headache  · Blood in vomit or stools (black or red color)  Date Last Reviewed: 11/5/2015  © 8144-1324 [employer]. [address] [place] [address]. All rights reserved. This information is not intended as a substitute for professional medical care. Always follow your healthcare professional's instructions.        Exercises to Prevent Falls  Certain types of exercises may help make you less likely to fall. Try the ones below. Or do other exercises that your health care provider suggests. Depending on your health, you may need to start slowly. Don't let that stop you. Even small amounts of exercise can help you. Be sure to talk to your health care provider before starting any exercise program.       Improve balance  Many types of exercise can help improve balance. Genaro chi and yoga are good examples. Here's another one to try. You can do it anytime and almost anywhere.  · Stand next to a counter or solid support.  · Push yourself up onto your  "tiptoes.  · Hold for 5 seconds. If you start to lose your balance, hold on to the counter.  · Rest and repeat 5 times. Work up to holding for 20 to 30 seconds, if you can. Increase flexibility  Being more flexible makes it easier for you to move around safely. Try exercises like the seated hamstring stretch.  · Sit in a chair and put one foot on a stool.  · Straighten your leg and reach with both hands down either side of your leg. Reach as far down your leg as you can.  · Hold for about 20 seconds.  · Go back to the starting position. Then repeat 5 times. Switch legs. Build strength  "Resistance" exercises help build strength. You can do them without equipment. Or you can use weights, elastic bands, or special machines. One such exercise is called the biceps curl. You can hold a 1-pound weight or even a can of soup. Do this exercise at least 3 times a week. Strive for every day.  · Sit up straight in a chair.  · Keep your elbow close to your body and your wrist straight.  · Bend your arm, moving your hand up to your shoulder. Then slowly lower your arm.  · Repeat 5 times. Switch to the other arm.   Build your staying power  Aerobic exercises make your heart and lungs stronger so you can keep moving longer. Walking and swimming are two of the best types of exercises you can do. Using a stationary bike is great, too. Find an aerobic exercise that you enjoy. Start slowly and build up. Even 5 minutes is helpful. Aim for a goal of 30 minutes, at least 3 times a week. You don't have to do 30 minutes in 1 session. Break it up and walk a little throughout the day.  More helpful tips  · Start easy. Slowly work up to doing more.  · Talk with your health care provider about the best exercises for you.  · Call senior centers or health clubs about exercise programs.  · If needed, have a family member watch you walk every so often to check your stability.  · Exercise with a friend. Choose an activity you both enjoy.  · Consider " ahsan chi or yoga to strengthen your balance.  · Try exercises that you can do anytime, anywhere. Here are 2 examples. Have someone with you when you first try these:  ¨ Practice walking by placing 1 foot right in front of the other.  ¨ Stand up and sit down 10 times. Repeat this throughout the day.   Date Last Reviewed: 6/13/2015  © 3816-5538 [employer]. [address] [place] [address], [place] [address]. All rights reserved. This information is not intended as a substitute for professional medical care. Always follow your healthcare professional's instructions.        Preventing Falls: Are You At Risk of Falling?     Ask for help to reduce risk of falling in your home.     As you get older, you're not as steady on your feet as you once were. And you may have health problems you didn't have when you were younger. So, it's not surprising that older people are more likely to trip and fall. Falling can be very serious. It can change your overall health and quality of life. That's why it's important to be aware of your own risk of falling.  The dangers of falling  Falls are one of the main causes of injury in people over age 65. An older person who falls may take longer to get better than a younger person. And, after a fall, an older person is more likely to have problems that don't go away. So, preventing falls can help you avoid serious health problems.  Are you at risk of falling?  Answer these questions to rate your level of risk.  · Are you a woman?  · Have you fallen or stumbled in the last year?  · Are you over age 65?  · Are you ever dizzy or lightheaded with standing?  · Do you have a hard time getting in and out of the bathtub or on and off the toilet?  · Do you lean on objects to help you get around? Or do you use a cane or walker?  · Do you have vision or hearing problems? For example, do you need new glasses or hearing aids?  · Do you have 2 or more long-lasting (chronic) medical conditions?  · Do you  "take 3 or more medicines?  · Have you felt depressed recently?  · Have you had more trouble with your memory in recent months?  · Are there hazards in your home that might cause you to fall, such as loose rugs or poor lighting?  · Do you have a pet that jumps on you or might trip you?  · Have you stopped getting regular exercise?  · Do you have diabetes?   · Do you have a neurologic disease, such as Parkinson or Alzheimer disease?   · Do you drink alcohol?  · Do you wear athletic shoes or slippers, or go barefoot at home?  You can help prevent falls  If you answered "yes" to any of the above questions, you should take steps to reduce your risk of a fall. Monitoring health conditions and keeping walkways in your home free of clutter are just 2 ways. Changing is sometimes easier said than done. But keep in mind that even small changes can make you less likely to fall.  The fear of falling  It's normal to be scared of falling, especially if you've fallen before. But being afraid can actually make you more likely to fall. This is because:  · Fear might cause you to become less active. Being less active can lead to a loss of strength and balance.  · Fear can lead to isolation from others, depression, or the use of more medicines or alcohol. And all these things make falling even more likely.  To break the cycle, learn more about ways to avoid falling. As you take control, you may find yourself feeling less afraid.   Date Last Reviewed: 6/12/2015  © 3107-7283 [employer]. [address] [place] [address], [place] [address]. All rights reserved. This information is not intended as a substitute for professional medical care. Always follow your healthcare professional's instructions.        Preventing Falls: How to Prepare and What to Do    Falling is not something you want to think about. But it can make a big difference to plan ahead. If you're prepared, you'll know how to get help. And you'll be less likely to panic " if you fall. This means you'll be able to do what's needed to get help right away.  How to prepare  · Have someone check on you daily.  · Keep a list of emergency numbers near the phone.  · Always have a way to call for help. Keep a cell phone with you at all times. Or talk with your healthcare provider about how to set up a home monitoring service. This involves wearing a small device around your neck or wrist. If you fall, you can press the button on the device. This alerts emergency responders.  · Talk with your healthcare provider about an exercise program that's right for you. Regular exercise may reduce the risk of falling and the risk for injury related to a fall.  · It's important to have good lighting in your home. Avoid using throw rugs, because they can raise your risk of tripping and falling. Add grab bars in the bathroom to help reduce the risk of falling. Small changes can make your home safer. Talk with your healthcare provider about making your home safer.  What to do if you fall  Above all, try to stay calm:  · If you start to fall, try to relax your body. This will reduce the impact of the fall.  · After you fall, press your monitor button, or phone for help.  · Don't rush to get up. First, make sure you're not hurt.  · Roll onto your side, then crawl to a chair. Pull yourself up onto the chair slowly.  · You should be checked if you struck your head, lost consciousness, were confused afterward, or have any other concerns for injury.  · Be sure to tell your doctor that you fell.  A note to family and friends  If you're with a loved one when he or she starts to fall, don't try to stop the fall. Ease the person to the floor carefully, so neither of you gets hurt. Don't leave the person alone. And don't try to move him or her. Put a pillow under his or her head. Check for injuries. If help is needed right away, be sure to call 911.   Date Last Reviewed: 6/13/2015  © 6802-5350 [employer]. [address] [place] [address]. All rights reserved. This information is not intended as a substitute for professional medical care. Always follow your healthcare professional's instructions.        Preventing Falls: Make Your Health a Priority    Having a health problem can make you more likely to fall. Taking certain kinds of medicines may also increase your risk of falls. So, improving your health can help you avoid a fall. Work with your healthcare provider to manage health problems and to review your medicines. If you have your health under control, your risk of falling is lessened.  How chronic conditions increase your fall risk  Health problems like diabetes, high or low blood pressure, and arthritis are called chronic health conditions. They can be managed, but they don't go away. Chronic health problems put you at greater risk of a fall. This is because they can affect many parts of your body. They may cause problems with movement, balance, or vision. And certain medicines you take for them may have side effects, such as dizziness or drowsiness. These side effects also increase your risk.  Work with your healthcare provider  Your healthcare provider can work with you to help prevent a fall. See your healthcare provider for a medical exam each year. Go more often if you have symptoms, such as leg numbness or dizziness, that could raise your risk of falling. If you have a history of falls, let your provider know. Bring a list of your medicines to review with your healthcare provider. Discuss your nutrition and exercise routine. And ask whether you need any tests to assess your risk of falling.  Review your medicines  Medicines (even ones you buy over the counter) can cause side effects that lead to a fall. Common medicines that cause these kinds of side effects are blood pressure, heart, or pain medicines, medicines for sleep, and antidepressants. Store pain medicine in a secure area, such as  an upper cabinet in your kitchen or bathroom. Don't mix different pills in the same bottle. Always store and travel with medicines in their original containers with clear labels. This will reduce the chance of taking the wrong medicine or too much of a medicine. Taking too much of a medicine or taking the wrong medicine may cause side effects that can increase your risk of falling.  Also, the way your body reacts to medicines can change as you age. So, certain medicines that were fine in the past may cause side effects now. Your healthcare provider (such as your doctor or pharmacist) can help review your medicines and make changes if needed.  Get your eyes and ears checked  Problems with vision or hearing can lead to falls:  · Get your eyes checked at least once a year. Take time to adjust to new glasses.  · Get your hearing checked at least every other year.  · Have your doctor check your inner ear for problems that may affect your balance.  Get the right nutrition  If you don't get enough to eat or drink, you can become dizzy and fall:  · Your sense of thirst decreases with age. Drink water throughout the day.  · Eat breakfast. Plan regular meals.  · Ask your healthcare provider whether you need supplements. These can help strengthen your bones and muscles to help prevent falls. They can also help prevent fractures if you do fall.  Stay as active as you can  Staying active is one of the best things you can do to prevent falls. Keep in mind that doing too little can be as risky as doing too much. That's because not being active can make you weaker and more likely to fall. Balance, flexibility, strength, and endurance all come from exercise. They all play a role in preventing falls. Ask your healthcare provider which types of activity are right for you.  When to call your healthcare provider  Be sure to call your healthcare provider if you fall. Also call if you have any of these signs or symptoms (someone else may  need to point them out to you):  · Feeling lightheaded or dizzy more than once a day  · Losing your balance often or feeling unsteady on your feet  · Feeling numbness in your legs or feet, or noticing a change in the way you walk  · Having a steady decline in your memory or mental sharpness   Date Last Reviewed: 6/13/2015  © 8018-4206 [employer]. [address] [place] [address], [place] [address]. All rights reserved. This information is not intended as a substitute for professional medical care. Always follow your healthcare professional's instructions.        Established High Blood Pressure    High blood pressure (hypertension) is a chronic disease. Often, healthcare providers dont know what causes it. But it can be caused by certain health conditions and medicines.  If you have high blood pressure, you may not have any symptoms. If you do have symptoms, they may include headache, dizziness, changes in your vision, chest pain, and shortness of breath. But even without symptoms, high blood pressure thats not treated raises your risk for heart attack and stroke. High blood pressure is a serious health risk and shouldnt be ignored.  A blood pressure reading is made up of two numbers: a higher number over a lower number. The top number is the systolic pressure. The bottom number is the diastolic pressure. A normal blood pressure is a systolic pressure of  less than 120 over a diastolic pressure of less than 80. You will see your blood pressure readings written together. For example, a person with a systolic pressure of 188 and a diastolic pressure of 78 will have 118/78 written in the medical record.  High blood pressure is when either the top number is 140 or higher, or the bottom number is 90 or higher. This must be the result when taking your blood pressure a number of times. The blood pressures between normal and high are called prehypertension.  Home care  If you have high blood pressure, you should do  what is listed below to lower your blood pressure. If you are taking medicines for high blood pressure, these methods may reduce or end your need for medicines in the future.  · Begin a weight-loss program if you are overweight.  · Cut back on how much salt you get in your diet. Heres how to do this:  ¨ Dont eat foods that have a lot of salt. These include olives, pickles, smoked meats, and salted potato chips.  ¨ Dont add salt to your food at the table.  ¨ Use only small amounts of salt when cooking.  · Start an exercise program. Talk with your healthcare provider about the type of exercise program that would be best for you. It doesn't have to be hard. Even brisk walking for 20 minutes 3 times a week is a good form of exercise.  · Dont take medicines that stimulate the heart. This includes many over-the-counter cold and sinus decongestant pills and sprays, as well as diet pills. Check the warnings about hypertension on the label. Before buying any over-the-counter medicines or supplements, always ask the pharmacist about the product's potential interaction with your high blood pressure and your high blood pressure medicines.  · Stimulants such as amphetamine or cocaine could be deadly for someone with high blood pressure. Never take these.  · Limit how much caffeine you get in your diet. Switch to caffeine-free products.  · Stop smoking. If you are a long-time smoker, this can be hard. Talk to your healthcare provider about medicines and nicotine replacement options to help you. Also, enroll in a stop-smoking program to make it more likely that you will quit for good.  · Learn how to handle stress. This is an important part of any program to lower blood pressure. Learn about relaxation methods like meditation, yoga, or biofeedback.  · If your provider prescribed medicines, take them exactly as directed. Missing doses may cause your blood pressure get out of control.  · If you miss a dose or doses, check with  your healthcare provider or pharmacist about what to do.  · Consider buying an automatic blood pressure machine. Ask your provider for a recommendation. You can get one of these at most pharmacies.     The American Heart Association recommends the following guidelines for home blood pressure monitoring:  · Don't smoke or drink coffee for 30 minutes before taking your blood pressure.  · Go to the bathroom before the test.  · Relax for 5 minutes before taking the measurement.  · Sit with your back supported (don't sit on a couch or soft chair); keep your feet on the floor uncrossed. Place your arm on a solid flat surface (like a table) with the upper part of the arm at heart level. Place the middle of the cuff directly above the eye of the elbow. Check the monitor's instruction manual for an illustration.  · Take multiple readings. When you measure, take 2 to 3 readings one minute apart and record all of the results.  · Take your blood pressure at the same time every day, or as your healthcare provider recommends.  · Record the date, time, and blood pressure reading.  · Take the record with you to your next medical appointment. If your blood pressure monitor has a built-in memory, simply take the monitor with you to your next appointment.  · Call your provider if you have several high readings. Don't be frightened by a single high blood pressure reading, but if you get several high readings, check in with your healthcare provider.  · Note: When blood pressure reaches a systolic (top number) of 180 or higher OR diastolic (bottom number) of 110 or higher, seek emergency medical treatment.  Follow-up care  You will need to see your healthcare provider regularly. This is to check your blood pressure and to make changes to your medicines. Make a follow-up appointment as directed. Bring the record of your home blood pressure readings to the appointment.  When to seek medical advice  Call your healthcare provider right away  if any of these occur:  · Blood pressure reaches a systolic (upper number) of 180 or higher OR a diastolic (bottom number) of 110 or higher  · Chest pain or shortness of breath  · Severe headache  · Throbbing or rushing sound in the ears  · Nosebleed  · Sudden severe pain in your belly (abdomen)  · Extreme drowsiness, confusion, or fainting  · Dizziness or spinning sensation (vertigo)  · Weakness of an arm or leg or one side of the face  · You have problems speaking or seeing   Date Last Reviewed: 12/1/2016  © 6617-9485 [employer]. [address] [place] [address]. All rights reserved. This information is not intended as a substitute for professional medical care. Always follow your healthcare professional's instructions.        Step-by-Step  Checking Your Blood Pressure    Date Last Reviewed: 4/27/2016  © 1066-3378 [employer]. [address] [place] [address]. All rights reserved. This information is not intended as a substitute for professional medical care. Always follow your healthcare professional's instructions.

## 2019-12-31 NOTE — PROGRESS NOTES
Outpatient Care Management  Initial Patient Assessment    Patient: Josephine Squires  MRN: [id number]  Date of Service: 12/18/2019  Completed by: Hayley Carbajal RN  Referral Date: 12/10/2019  Program: Case Management (High Risk)    Reason for Visit   Patient presents with    Initial Assessment    Plan Of Care    PHQ-9    OPCM Enrollment Call    Medication Reconciliation       Brief Summary:   Initial Assessments completed with patient and her daughter with patients verbal permission on the phone today. Patient has PMH of HTN, GERD, Chest pain, Falls and weakness. Daughter/Caregiver reports patient is independent with all of her ADLs and uses a walker for balance with ambulation. Daughter reports they are waiting to hear if insurance will cover a motorized wheelchair as patient has very little endurance when walking and tires easily. Daughter reports this limits her ability to do the things she likes to do. Daughter reports patient has fallen several times in the last year as a result. Daughter would like for patient to participate in Out patient Physical Therapy in [place], [place].   Daughter reports patient has a BP cuff at home but does not take BP reading on a daily basis. OPCM encouraged patient to do so. I will mail logs for BP documentation. Encouraged patient/caregiver to communicate with physician and call this RN if having any questions/concerns. OPCM will continue to follow. Patient is agreeable to follow up call in 10 days in am. HAKEEM Wang OPCM     Falls: Care Plan Created (In basket message to Dr. Felder regarding order for Out patient Physical Therapy)      Assessment Documentation     OPCM Initial Assessment    Involvement of Care  Do I have permission to speak with other family members about your care?:  Yes (Comment: Son Joe Squires)  Assessment completed by:  Patient, Children  Patient Reported Insurance  Verified current insurance plan:  Medicare, Medicaid  Current Health Status  Patient  Health Rating  Compared to other people your age, how would you rate your health?:  Good  Patient Reported Labs & Vitals  Social Determinants  Advanced Care Planning  Do you have any of the following?:  Caregiver make informed decisions on your behalf  If yes, do we have a copy?:  N/A  If no, would the patient like Advance Directive resources?:  Yes  Advanced Care Planning resources provided?:  Yes  Is Advanced Care Planning an area of need?:  Yes  Support  Caregiver presence?:  Yes  Name of primary caregiver:  DAUGHTER ANAT SINGER  Primary caregiver phone number:  [phone]  Primary caregiver relationship:  son/daughter  Present activity level:  not limited in any of these ways  Who takes you to your medical appointments?:  son/daughter  Is the caregiver supporting a need?:  Yes  Does the current primary caregiver meet the patient's needs?:  Yes  Housing  Living arrangements:  children  Type of residence:  single family home  Own or rent?:  own  Permanent residence?:  yes  Does the patient's residence have any of the following?:  grab bars in the bathroom  Is housing an area of need?:  no  Access to [employer]  Does the patient have access to any of the following devices or technologies?:  home computer, Internet access  Clinical Assessment  Medication Adherence  How does the patient obtain their medications?:  family picks up  How many days a week do you miss medications?:  never  Do you use a pill box or medication chart to help you manage your medications?:  no  Do you sometimes have difficulty refilling your medications?:  no  Medication reconciliation completed?:  Yes  Is medication adherence an area of need?:  No  *Active medication list was reviewed and reconciled with patient and/or caregiver:    Nutritional Status  Diet:  low sodium  Change in appetite?:  no  Recent changes in weight?:  no  Dentition:  N/A  Is nutrition an area of need?:  no  Labs  Do you have regular lab work to monitor  your medications?:  no  Type of lab work:  n/a  Where do you get your lab work done?:  [facility]  Is lab adherence an area of need?:  no  PHQ Depression Screen  Does patient's PHQ Depression Screening indicate a barrier to meeting self-care needs?:  No  Cognitive/Behavioral Health  Alert and oriented?:  yes  Difficulty thinking?:  no  Requires prompting?:  no  Requires assistance for routine expression?:  no  Is Cognitive/Behavioral health an area of need?:  no  Culture/Lutheran  Does patient have cultural or Samaritan beliefs that may impact ability to access healthcare?:  no  Communication  Language preference:  English   needed?:  no  Hearing problems?:  no  Decreased vision?:  no  Legally blind?:  no  Vision assistance:  glasses (Comment: Reading only)  Is Communication an area of need?:  no  Health Literacy  Preferred learning method:  face to face, reading materials  How often do you need to have someone help you read instructions, pamphlets, or other written material from your doctor or pharmacy?:  occasionally  Is there a Health Literacy need?:  no  Activities of Daily Living  Ambulation:  independent  Dressing:  independent  Bathing:  independent  Transfers:  independent  Toileting:  independent  Feeding:  independent  Cleaning home/chores:  assistance required  Telephone use:  independent  Shopping/attending doctors' appointments:  assistance required  Paying bills:  assistance required  Taking meds:  independent  Climbing stairs:  assistance required  Fall Risk  Patient mobility status:  Ambulatory (Comment: Uses walker for balance)  Number of falls in the past 12 months:  2+  Fall risk?:  Yes  Equipment/Current Services  Equipment/supplies used in home:  walker  Current services:  n/a  Is Equipment/Supplies/Services an area of need?:  no  Community & Government Programs  Support:  none  Government suppot assistance:  N/A  [facility] Office of Aging and Adult Services:  N/A  Community Resource  Assessment  Based on the assessment of needs:  No community resources needed at this time  Completion of Initial Assessment  Is the Initial Assessment Complete at this time?:  yes         Problem List and History     Patient Active Problem List   Diagnosis    Hypertension    Hyperlipidemia    GERD (gastroesophageal reflux disease)    Costochondritis    Chest pain    Pulmonary nodule    At risk for falling    Stricture of artery     Left leg weakness       Reviewed Active Problem List with patient and/or Caregiver. The following were identified as areas of need: HTN and Falls    Medical History:  Reviewed medical history with patient and/or caregiver    Social History:  Reviewed social history with patient and/or caregiver    Complex Care Plan    Care plan was discussed and completed today with input from patient and/or caregiver.    Goals      Patient/caregiver will check and record blood pressure daily. If >140/90 for 3 days, patient/caregiver will know to notify Physician, prior to discharge from OPCM - Priority: High      Overall Time to Completion  2 months from 12/31/2019     PCM Identified Patient Barriers:  Advanced Care Planning: Mailed information packet  Falls: Care Plan Created (In basket message to Dr. Felder regarding order for Out patient Physical Therapy)      OPCM Identified Disease Education Barriers:  Hypertension: Care Plan Created     Short Term Goals  Patient/caregiver will measure and record the blood pressure 1 times per day for 2 weeks.  Interventions   · Assess for availability of working blood pressure cuff in home setting.  · Assess for retention of the signs and symptoms of disease specific exacerbation.  · Assess patient's ability to perform ADLs.  · Collaborate with Physician as appropriate to meet patient needs.  · Complete medication reconciliation.  · Empower patient/caregiver to discuss treatment plan with Physician/care team.  · Encourage compliance with Physician  follow-ups.  · Encourage Dietary Compliance.  · Encourage Exercise.  · Encourage Medication Compliance.  · Facilitate needed DME.  · Mail Blood pressure logs for home use.  · Recognize and provide educational material (KRAMES).  · Review eating/nutrition habits.     Status  · Partially met      Patient/caregiver will verbalize 2 red flags of Hypertension/Hypotension and know when to contact Physician within 2 weeks.  Interventions   · Assess for availability of working blood pressure cuff in home setting.  · Assess for retention of the signs and symptoms of disease specific exacerbation.  · Assess patient's ability to perform ADLs.  · Collaborate with Physician as appropriate to meet patient needs.  · Complete medication reconciliation.  · Empower patient/caregiver to discuss treatment plan with Physician/care team.  · Encourage compliance with Physician follow-ups.  · Encourage Dietary Compliance.  · Encourage Exercise.  · Encourage Medication Compliance.  · Facilitate needed DME.  · Mail Blood pressure logs for home use.  · Recognize and provide educational material (KRAMES).  · Review eating/nutrition habits.     Status  · Partially met      Patient/caregiver will verbalize 2 signs and symptoms of Hypertension/Hypotension within 3 weeks.   Interventions   · Assess for availability of working blood pressure cuff in home setting.  · Assess for retention of the signs and symptoms of disease specific exacerbation.  · Assess patient's ability to perform ADLs.  · Collaborate with Physician as appropriate to meet patient needs.  · Complete medication reconciliation.  · Empower patient/caregiver to discuss treatment plan with Physician/care team.  · Encourage compliance with Physician follow-ups.  · Encourage Dietary Compliance.  · Encourage Exercise.  · Encourage Medication Compliance.  · Facilitate needed DME.  · Mail Blood pressure logs for home use.  · Recognize and provide educational material (SCARLET).  · Review  eating/nutrition habits.     Status  · Partially met      Patient/caregiver will verbalize importance of discussing symptoms during appointment with physician and taking an active role during the appointment with physician within 1 month.  Interventions   · Assess for availability of working blood pressure cuff in home setting.  · Assess for retention of the signs and symptoms of disease specific exacerbation.  · Assess patient's ability to perform ADLs.  · Collaborate with Physician as appropriate to meet patient needs.  · Complete medication reconciliation.  · Empower patient/caregiver to discuss treatment plan with Physician/care team.  · Encourage compliance with Physician follow-ups.  · Encourage Dietary Compliance.  · Encourage Exercise.  · Encourage Medication Compliance.  · Facilitate needed DME.  · Mail Blood pressure logs for home use.   · Recognize and provide educational material (SCARLET).  · Review eating/nutrition habits.     Status  · Partially met       Clinical Reference Documents Added to Patient Instructions       Document    CHECKING YOUR BLOOD PRESSURE, STEP-BY-STEP (ENGLISH)    FALL DUE TO DIZZINESS, WEAKNESS, OR LOSS OF BALANCE (ENGLISH)    FALLS, PREVENTING, ARE YOU AT RISK OF FALLING? (ENGLISH)    FALLS, PREVENTING, EXERCISES TO IMPROVE BALANCE, FLEXIBILITY, STRENGTH, AND STAYING POWER (ENGLISH)    FALLS, PREVENTING, HOW TO PREPARE AND WHAT TO DO (ENGLISH)    FALLS, PREVENTING, MAKE YOUR HEALTH A PRIORITY (ENGLISH)    HYPERTENSION, ESTABLISHED (ENGLISH)                     Patient/caregiver will have Safety Plan in place prior to discharge from [facility]. - Priority: High      Overall Time to Completion  2 months from 12/31/2019     [facility] Identified Patient Barriers:  Advanced Care Planning: Mailed information packet  Falls: Care Plan Created (In basket message to Dr. Felder regarding order for Out patient Physical Therapy)      [facility] Identified Disease Education Barriers:  Hypertension: Care Plan  Created     Short Term Goals  Patient/caregiver will verbalize importance of having a safe home environment by keeping room free of clutter, making sure no electrical cords placed in walk ways, making sure rooms are well lit, placing non-skid bath mats and removing throw rugs within 2 weeks.  Interventions   · Assess patient's ability to perform ADLs.  · Collaborate with Physician as appropriate to meet patient needs.  · Complete medication reconciliation.  · Encourage compliance with Physician follow-ups.  · Encourage Exercise.  · Encourage family/caregiver to help patient perform ADLs according to the patient's capabilities.  · Encourage Medication Compliance.  · Recognize and provide educational material (CL3VER).     Status  · Partially met                  Patient Instructions     Instructions were provided via the [employer] patient resources and are available for the patient to view on the patient portal, if active.    Next steps: Follow up in about 10 days (around 1/10/2020) for RN OPCM f/u.    Todays OPCM Self-Management Care Plan was developed with the patients/caregivers input and was based on identified barriers from todays assessment.  Goals were written today with the patient/caregiver and the patient has agreed to work towards these goals to improve his/her overall well-being. Patient verbalized understanding of the care plan, goals, and all of today's instructions. Encouraged patient/caregiver to communicate with his/her physician and health care team about health conditions and the treatment plan.  Provided my contact information today and encouraged patient/caregiver to call me with any questions as needed.

## 2020-01-10 ENCOUNTER — TELEPHONE (OUTPATIENT)
Dept: INTERNAL MEDICINE | Facility: CLINIC | Age: 85
End: 2020-01-10

## 2020-01-10 ENCOUNTER — OUTPATIENT CASE MANAGEMENT (OUTPATIENT)
Dept: ADMINISTRATIVE | Facility: OTHER | Age: 85
End: 2020-01-10

## 2020-01-10 DIAGNOSIS — R29.898 LEFT LEG WEAKNESS: Primary | ICD-10-CM

## 2020-01-10 NOTE — TELEPHONE ENCOUNTER
----- Message from Hayley Carbajal RN sent at 1/10/2020  1:08 PM CST -----  [facility] reports [facility] have not received the order from you for patient to begin Physical Therapy (see previous e-mail sent below)    ----- Message -----  From: Hayley Carbajal RN  Sent: 12/31/2019  10:20 AM CST  To: Bradford Meza    Hi, this is Hayley Carbajal RN with [facility] Management team. I received a referral on the following patient. I spoke with patient's daughter today on the telephone.    Daughter would like for patient to participate in Out patient Physical Therapy for strengthening.   If you are agreeable please fax order with [employer], Insurance information and recent progress note with documentation of reason for needs to [facility] in [facility].     [facility]   Fax # [phone]  Phone # [phone]    Please call and notify patient of your recommendations or if there is a different location you prefer for PT.      Thank you,  Hayley Carbajal RN

## 2020-01-10 NOTE — TELEPHONE ENCOUNTER
[facility] is requesting to have an order placed for the patient to start PT. [phone] [phone]. LOV: 12/10/19.      Please Advise

## 2020-01-13 NOTE — PROGRESS NOTES
Outpatient Care Management  Plan of Care Follow Up Visit    Patient: Josephine Squires  MRN: [id number]  Date of Service: 01/10/2020  Completed by: Hayley Carbajal RN  Referral Date: 12/10/2019  Program: Case Management (High Risk)    Reason for Visit   Patient presents with    Update Plan Of Care       Brief Summary:   Contacted patient by phone today for follow up visit. Resent inFÃ[employer]Â©vrier 46et message to Dr. Felder regarding order for PT at [facility] in [place]. Encouraged patient/caregiver to communicate with physician and call this RN if having any questions/concerns. OPCM will continue to follow. Patient is agreeable to follow up call in 2 weeks at any time.  HAKEEM Wang OPCM         Patient Summary     Involvement of Care:  Do I have permission to speak with other family members about your care?       Patient Reported Labs & Vitals:  1.  Any Patient Reported Labs & Vitals?  Yes  2.  Patient Reported Blood Pressure:  130/90  3.  Patient Reported Pulse:     4.  Patient Reported Weight (Kg):     5.  Patient Reported Blood Glucose (mg/dl):       Medical History:  Reviewed medical history with patient and/or caregiver    Social History:  Reviewed social history with patient and/or caregiver    Clinical Assessment     Reviewed and provided basic information on available community resources for mental health, transportation, wellness resources, and palliative care programs with patient and/or caregiver.    Complex Care Plan     Care plan was discussed and completed today with input from patient and/or caregiver.    Goals      Patient/caregiver will check and record blood pressure daily. If >140/90 for 3 days, patient/caregiver will know to notify Physician, prior to discharge from OPCM - Priority: High      Overall Time to Completion  2 months from 12/31/2019     PCM Identified Patient Barriers:  Advanced Care Planning: Mailed information packet  Falls: Care Plan Created (In basket message to Dr. Felder  regarding order for Out patient Physical Therapy)      OPCM Identified Disease Education Barriers:  Hypertension: Care Plan Created     Short Term Goals  Patient/caregiver will measure and record the blood pressure 1 time per day for 2 weeks.  Interventions   · Assess for availability of working blood pressure cuff in home setting.  · Assess for retention of the signs and symptoms of disease specific exacerbation.  · Assess patient's ability to perform ADLs.  · Collaborate with Physician as appropriate to meet patient needs.  · Complete medication reconciliation.  · Empower patient/caregiver to discuss treatment plan with Physician/care team.  · Encourage compliance with Physician follow-ups.  · Encourage Dietary Compliance.  · Encourage Exercise.  · Encourage Medication Compliance.  · Facilitate needed DME.  · Mail Blood pressure logs for home use.  · Recognize and provide educational material (KRAMES).  · Review eating/nutrition habits.     Status  · Partially met  · 1/13 Met Patient reports daily BP checks and writing it down      Patient/caregiver will verbalize signs and symptoms/ 2 red flags of Hypertension/Hypotension and know when to contact Physician within 2 weeks.  Interventions   · Assess for availability of working blood pressure cuff in home setting.  · Assess for retention of the signs and symptoms of disease specific exacerbation.  · Assess patient's ability to perform ADLs.  · Collaborate with Physician as appropriate to meet patient needs.  · Complete medication reconciliation.  · Empower patient/caregiver to discuss treatment plan with Physician/care team.  · Encourage compliance with Physician follow-ups.  · Encourage Dietary Compliance.  · Encourage Exercise.  · Encourage Medication Compliance.  · Facilitate needed DME.  · Mail Blood pressure logs for home use.  · Recognize and provide educational material (KRAMES).  · Review eating/nutrition habits.     Status  · Partially met  · 1/13 Educated  that most people with HTN have no s/s. A few people may have headaches, dizziness, chest tightness, SOB or nosebleeds. Symptoms of Hypotension include fainting, dizziness, blurred vision and nausea and patient voices understanding/greement.       Patient/caregiver will verbalize importance of discussing symptoms during appointment with physician and taking an active role during the appointment with physician within 1 month.  Interventions   · Assess for availability of working blood pressure cuff in home setting.  · Assess for retention of the signs and symptoms of disease specific exacerbation.  · Assess patient's ability to perform ADLs.  · Collaborate with Physician as appropriate to meet patient needs.  · Complete medication reconciliation.  · Empower patient/caregiver to discuss treatment plan with Physician/care team.  · Encourage compliance with Physician follow-ups.  · Encourage Dietary Compliance.  · Encourage Exercise.  · Encourage Medication Compliance.  · Facilitate needed DME.  · Mail Blood pressure logs for home use.   · Recognize and provide educational material (SCARLET).  · Review eating/nutrition habits.     Status  · Partially met  · 1/13 Met Patient is documenting daily BP readings to bring in to share with MD at next appointment.        Clinical Reference Documents Added to Patient Instructions       Document    CHECKING YOUR BLOOD PRESSURE, STEP-BY-STEP (ENGLISH)    FALL DUE TO DIZZINESS, WEAKNESS, OR LOSS OF BALANCE (ENGLISH)    FALLS, PREVENTING, ARE YOU AT RISK OF FALLING? (ENGLISH)    FALLS, PREVENTING, EXERCISES TO IMPROVE BALANCE, FLEXIBILITY, STRENGTH, AND STAYING POWER (ENGLISH)    FALLS, PREVENTING, HOW TO PREPARE AND WHAT TO DO (ENGLISH)    FALLS, PREVENTING, MAKE YOUR HEALTH A PRIORITY (ENGLISH)    HYPERTENSION, ESTABLISHED (ENGLISH)                     Patient/caregiver will have Safety Plan in place prior to discharge from OPCM. - Priority: High      Overall Time to Completion  2  months from 12/31/2019     OPCM Identified Patient Barriers:  Advanced Care Planning: Mailed information packet  Falls: Care Plan Created (In basket message to Dr. Felder regarding order for Out patient Physical Therapy)      OPCM Identified Disease Education Barriers:  Hypertension: Care Plan Created     Short Term Goals  Patient/caregiver will verbalize importance of having a safe home environment by keeping room free of clutter, making sure no electrical cords placed in walk ways, making sure rooms are well lit, placing non-skid bath mats and removing throw rugs within 2 weeks.  Interventions   · Assess patient's ability to perform ADLs.  · Collaborate with Physician as appropriate to meet patient needs.  · Complete medication reconciliation.  · Encourage compliance with Physician follow-ups.  · Encourage Exercise.  · Encourage family/caregiver to help patient perform ADLs according to the patient's capabilities.  · Encourage Medication Compliance.  · Recognize and provide educational material (SCARLET).     Status  · Partially met   · 1/13 Continued discussions on fall prevention/home safety like good lighting, clear pathways, no throw rugs and no electrical cords. Discussed fall prevention outside the home like walking slowly and looking ahead, avoiding uneven surfaces, using handrails whenever available, watching for slippery feli, especially polished surfaces and patient voiced understanding/agreeemnt.   · Patient has not heard from [facility] yet to schedule outpatient PT. OPCM sent another note to Dr. Felder requesting order be faxed to [facility] in [place] for PT there.                   Patient Instructions     Instructions were provided via the [employer] patient resources and are available for the patient to view on the patient portal.    Next Steps: Follow up in about 17 days (around 1/27/2020) for RN OPCM f/u.    Patient verbalized understanding of the care plan, goals, and all of today's  instructions. Encouraged patient/caregiver to communicate with his/her physician and health care team about health conditions and the treatment plan.  Provided my contact information today and encouraged patient/caregiver to call me with any questions as needed.

## 2020-01-14 ENCOUNTER — TELEPHONE (OUTPATIENT)
Dept: INTERNAL MEDICINE | Facility: CLINIC | Age: 85
End: 2020-01-14

## 2020-01-14 NOTE — TELEPHONE ENCOUNTER
----- Message from Hayley Carbajal RN sent at 1/13/2020 12:56 PM CST -----  Ean Joyner did Dr. Felder send the PT order to [facility]?     [facility]:   Fax # [phone]   Phone # [phone]     Thanks Hayley

## 2020-01-15 ENCOUNTER — TELEPHONE (OUTPATIENT)
Dept: INTERNAL MEDICINE | Facility: CLINIC | Age: 85
End: 2020-01-15

## 2020-01-15 DIAGNOSIS — Z91.81 AT RISK FOR FALLING: Primary | ICD-10-CM

## 2020-01-15 NOTE — TELEPHONE ENCOUNTER
----- Message from Anya Rivero MA sent at 12/31/2019 11:28 AM CST -----      ----- Message -----  From: Hayley Carbajal RN  Sent: 12/31/2019  10:20 AM CST  To: [facility] BERGERON Staff    Hi, this is Hayley Carbajal RN with [facility] team. I received a referral on the following patient. I spoke with patient's daughter today on the telephone.    Daughter would like for patient to participate in Out patient Physical Therapy for strengthening.   If you are agreeable please fax order with Demographics, Insurance information and recent progress note with documentation of reason for needs to [facility] in [facility].     [facility]   Fax # [phone]  Phone # [phone]    Please call and notify patient of your recommendations or if there is a different location you prefer for PT.      Thank you,  Hayley Carbajal RN

## 2020-01-15 NOTE — TELEPHONE ENCOUNTER
Patient demographics, insurance information and progress notes faxed over to [facility] physical therapy on today. Faxed to [phone].

## 2020-01-27 ENCOUNTER — OUTPATIENT CASE MANAGEMENT (OUTPATIENT)
Dept: ADMINISTRATIVE | Facility: OTHER | Age: 85
End: 2020-01-27

## 2020-01-27 NOTE — PROGRESS NOTES
Outpatient Care Management  Plan of Care Follow Up Visit    Patient: Josephine Squires  MRN: [id number]  Date of Service: 01/27/2020  Completed by: Hayley Carbajal RN  Referral Date: 12/10/2019  Program: Case Management (High Risk)    Reason for Visit   Patient presents with    Update Plan Of Care       Brief Summary:   Contacted patients daughter Geri España by phone today for follow up visit. Daughter reports she does not think her mother received educational materials including BP logs in the mail and she will check when she goes to her house. Daughter will let me know. Daughter reports she does take her BP daily though and is logging it on a piece of paper. Daughter reprots she did start her physical therapy at [facility] there in [place]. Encouraged daughter to communicate with physician and call this RN if having any questions/concerns. OPCM will continue to follow. HAKEEM Wang OPCM       Patient Summary     Involvement of Care:  Do I have permission to speak with other family members about your care?       Patient Reported Labs & Vitals:  1.  Any Patient Reported Labs & Vitals?     2.  Patient Reported Blood Pressure:     3.  Patient Reported Pulse:     4.  Patient Reported Weight (Kg):     5.  Patient Reported Blood Glucose (mg/dl):       Medical History:  Reviewed medical history with patient and/or caregiver    Social History:  Reviewed social history with patient and/or caregiver    Clinical Assessment     Reviewed and provided basic information on available community resources for mental health, transportation, wellness resources, and palliative care programs with patient and/or caregiver.    Complex Care Plan     Care plan was discussed and completed today with input from patient and/or caregiver.    Goals      Patient/caregiver will check and record blood pressure daily. If >140/90 for 3 days, patient/caregiver will know to notify Physician, prior to discharge from OPCM - Priority: High      Overall Time  to Completion  2 months from 12/31/2019     PCM Identified Patient Barriers:  Advanced Care Planning: Mailed information packet  Falls: Care Plan Created (In basket message to Dr. Felder regarding order for Out patient Physical Therapy)      OPCM Identified Disease Education Barriers:  Hypertension: Care Plan Created     Short Term Goals  Patient/caregiver will measure and record the blood pressure 1 time per day for 2 weeks.  Interventions   · Assess for availability of working blood pressure cuff in home setting.  · Assess for retention of the signs and symptoms of disease specific exacerbation.  · Assess patient's ability to perform ADLs.  · Collaborate with Physician as appropriate to meet patient needs.  · Complete medication reconciliation.  · Empower patient/caregiver to discuss treatment plan with Physician/care team.  · Encourage compliance with Physician follow-ups.  · Encourage Dietary Compliance.  · Encourage Exercise.  · Encourage Medication Compliance.  · Facilitate needed DME.  · Mail Blood pressure logs for home use.  · Recognize and provide educational material (SCARLET).  · Review eating/nutrition habits.     Status  · Partially met  · 1/13 Met Patient reports daily BP checks and writing it down      Patient/caregiver will verbalize signs and symptoms/ 2 red flags of Hypertension/Hypotension and know when to contact Physician within 2 weeks.  Interventions   · Assess for availability of working blood pressure cuff in home setting.  · Assess for retention of the signs and symptoms of disease specific exacerbation.  · Assess patient's ability to perform ADLs.  · Collaborate with Physician as appropriate to meet patient needs.  · Complete medication reconciliation.  · Empower patient/caregiver to discuss treatment plan with Physician/care team.  · Encourage compliance with Physician follow-ups.  · Encourage Dietary Compliance.  · Encourage Exercise.  · Encourage Medication Compliance.  · Facilitate  needed DME.  · Mail Blood pressure logs for home use.  · Recognize and provide educational material (SCARLET).  · Review eating/nutrition habits.     Status  · Partially met  · 1/13 Educated that most people with HTN have no s/s. A few people may have headaches, dizziness, chest tightness, SOB or nosebleeds. Symptoms of Hypotension include fainting, dizziness, blurred vision and nausea and patient voices understanding/greement.   · 1/27 Continued ongoing discussions with daughter regarding possible s/s of HTN and Hypotension and she is aware. Daughter does not live with patient and did not have access to readings at this time. Discussed that if BP is >140/90 for 3 consecutive days to notify MD and patient daughter understanding/agreement.  ·       Patient/caregiver will verbalize importance of discussing symptoms during appointment with physician and taking an active role during the appointment with physician within 1 month.  Interventions   · Assess for availability of working blood pressure cuff in home setting.  · Assess for retention of the signs and symptoms of disease specific exacerbation.  · Assess patient's ability to perform ADLs.  · Collaborate with Physician as appropriate to meet patient needs.  · Complete medication reconciliation.  · Empower patient/caregiver to discuss treatment plan with Physician/care team.  · Encourage compliance with Physician follow-ups.  · Encourage Dietary Compliance.  · Encourage Exercise.  · Encourage Medication Compliance.  · Facilitate needed DME.  · Mail Blood pressure logs for home use.   · Recognize and provide educational material (SCARLET).  · Review eating/nutrition habits.     Status  · Partially met  · 1/13 Met Patient is documenting daily BP readings to bring in to share with MD at next appointment.        Clinical Reference Documents Added to Patient Instructions       Document    CHECKING YOUR BLOOD PRESSURE, STEP-BY-STEP (ENGLISH)    FALL DUE TO DIZZINESS,  WEAKNESS, OR LOSS OF BALANCE (ENGLISH)    FALLS, PREVENTING, ARE YOU AT RISK OF FALLING? (ENGLISH)    FALLS, PREVENTING, EXERCISES TO IMPROVE BALANCE, FLEXIBILITY, STRENGTH, AND STAYING POWER (ENGLISH)    FALLS, PREVENTING, HOW TO PREPARE AND WHAT TO DO (ENGLISH)    FALLS, PREVENTING, MAKE YOUR HEALTH A PRIORITY (ENGLISH)    HYPERTENSION, ESTABLISHED (ENGLISH)                          Patient Instructions     Instructions were provided via the [employer] patient [facility] and are available for the patient to view on the patient portal.    Next Steps:    Follow up in about 2 weeks (around 2/10/2020) for RN OPCM f/u.    Patient verbalized understanding of the care plan, goals, and all of today's instructions. Encouraged patient/caregiver to communicate with his/her physician and health care team about health conditions and the treatment plan.  Provided my contact information today and encouraged patient/caregiver to call me with any questions as needed.

## 2020-02-11 ENCOUNTER — OUTPATIENT CASE MANAGEMENT (OUTPATIENT)
Dept: ADMINISTRATIVE | Facility: OTHER | Age: 85
End: 2020-02-11

## 2020-02-11 NOTE — LETTER
February 24, 2020    Josephine Madrigal Squires  [address]  [place] [address]             [facility] [address] [facility] [place] [address] Dear Ms. Pegueroedes,             I have been unsuccessful at reaching you to follow-up to see how you have been doing. I work with [facility] Outpatient Case Management Department. Please call me if you have any health care needs.           I can be reached at [phone] from 8:00AM to 4:30 PM on Monday thru Friday. [facility] is a program offered through [facility] where a nurse is available 24/7 to answer questions or provide medical advice, their number is [phone].    Thanks,    Hayley Carbajal RN   Outpatient Case Management

## 2020-02-24 NOTE — PROGRESS NOTES
3rd Attempt to complete follow-up for Outpatient Care Management, left message requesting a return call. I will mail a letter with my contact information requesting a return call.This RN will close case at this time.

## 2020-03-18 RX ORDER — LISINOPRIL AND HYDROCHLOROTHIAZIDE 10; 12.5 MG/1; MG/1
TABLET ORAL
Refills: 0 | OUTPATIENT
Start: 2020-03-18

## 2020-03-23 RX ORDER — LISINOPRIL AND HYDROCHLOROTHIAZIDE 10; 12.5 MG/1; MG/1
TABLET ORAL
Refills: 0 | OUTPATIENT
Start: 2020-03-23

## 2020-03-24 RX ORDER — LISINOPRIL AND HYDROCHLOROTHIAZIDE 10; 12.5 MG/1; MG/1
TABLET ORAL
Qty: 90 TABLET | Refills: 2 | Status: SHIPPED | OUTPATIENT
Start: 2020-03-24 | End: 2020-07-04 | Stop reason: SDUPTHER

## 2020-03-24 NOTE — TELEPHONE ENCOUNTER
----- Message from Tabatha Ashraf sent at 3/24/2020 11:42 AM CDT -----  Contact: Luz/daughter [phone]  Type:  RX Refill Request    Who Called: Luz/daughter   Refill or New Rx:refill   RX Name and Strength: Lisinopril/hctz 10/12.5mg  How is the patient currently taking it? (ex. 1XDay):1x day  Is this a 30 day or 90 day RX:90 day  Preferred Pharmacy with phone number:    [facility] [address] [place] [address] - [address]  [address] [facility] [address]  Phone: [phone] Fax: [phone]    Local or Mail Order:local  Ordering Provider: Weeks  Would the patient rather a call back or a response via [facility]? Call back   Best Call Back Number:[phone]  Additional Information:

## 2020-07-02 ENCOUNTER — OFFICE VISIT (OUTPATIENT)
Dept: INTERNAL MEDICINE | Facility: CLINIC | Age: 85
End: 2020-07-02
Payer: MEDICARE

## 2020-07-02 VITALS
HEART RATE: 79 BPM | WEIGHT: 174.81 LBS | TEMPERATURE: 99 F | OXYGEN SATURATION: 95 % | SYSTOLIC BLOOD PRESSURE: 139 MMHG | HEIGHT: 64 IN | BODY MASS INDEX: 29.84 KG/M2 | DIASTOLIC BLOOD PRESSURE: 66 MMHG

## 2020-07-02 DIAGNOSIS — T14.8XXA WOUND INFECTION: Primary | ICD-10-CM

## 2020-07-02 DIAGNOSIS — L08.9 WOUND INFECTION: Primary | ICD-10-CM

## 2020-07-02 DIAGNOSIS — I10 ESSENTIAL HYPERTENSION: ICD-10-CM

## 2020-07-02 PROCEDURE — 99213 OFFICE O/P EST LOW 20 MIN: CPT | Mod: S$PBB,,, | Performed by: FAMILY MEDICINE

## 2020-07-02 PROCEDURE — 99213 OFFICE O/P EST LOW 20 MIN: CPT | Mod: PBBFAC,PO,25 | Performed by: FAMILY MEDICINE

## 2020-07-02 PROCEDURE — 99999 PR PBB SHADOW E&M-EST. PATIENT-LVL III: CPT | Mod: PBBFAC,,, | Performed by: FAMILY MEDICINE

## 2020-07-02 PROCEDURE — G0009 ADMIN PNEUMOCOCCAL VACCINE: HCPCS | Mod: PBBFAC,PO

## 2020-07-02 PROCEDURE — 99213 PR OFFICE/OUTPT VISIT, EST, LEVL III, 20-29 MIN: ICD-10-PCS | Mod: S$PBB,,, | Performed by: FAMILY MEDICINE

## 2020-07-02 PROCEDURE — 99999 PR PBB SHADOW E&M-EST. PATIENT-LVL III: ICD-10-PCS | Mod: PBBFAC,,, | Performed by: FAMILY MEDICINE

## 2020-07-02 PROCEDURE — 90750 HZV VACC RECOMBINANT IM: CPT | Mod: PBBFAC,PO

## 2020-07-02 PROCEDURE — 90472 IMMUNIZATION ADMIN EACH ADD: CPT | Mod: PBBFAC,PO

## 2020-07-02 RX ORDER — CEPHALEXIN 500 MG/1
500 CAPSULE ORAL EVERY 12 HOURS
Qty: 14 CAPSULE | Refills: 0 | Status: CANCELLED | OUTPATIENT
Start: 2020-07-02 | End: 2020-07-09

## 2020-07-02 NOTE — PROGRESS NOTES
Subjective:       Patient ID: Josephine Squires is a 89 y.o. female.    Chief Complaint: Check up on RT leg injury    Patient here with a puncture wound to the right lateral lower leg. Injury occurred 10 days ago. She was injured by one of her roosters. She has been using neosporin ointment but has gotten worse over the last few days. She is not up to date on her tetanus vaccine. Otherwise doing well.       History reviewed. No pertinent family history.    Current Outpatient Medications:     aspirin (ECOTRIN) 81 MG EC tablet, Take 81 mg by mouth., Disp: , Rfl:     aspirin-acetaminophen-caffeine 250-250-65 mg (EXCEDRIN MIGRAINE) 250-250-65 mg per tablet, Take 1 tablet by mouth every 6 (six) hours as needed for Pain., Disp: , Rfl:     cephALEXin (KEFLEX) 500 MG capsule, Take 1 capsule (500 mg total) by mouth 3 (three) times daily. for 7 days, Disp: 21 capsule, Rfl: 0    cycloSPORINE (RESTASIS) 0.05 % ophthalmic emulsion, Place 0.4 mLs (1 drop total) into both eyes 2 (two) times daily., Disp: 180 vial, Rfl: 3    diclofenac sodium (VOLTAREN) 1 % Gel, Apply 2 g topically once daily. (Patient not taking: Reported on 7/2/2020), Disp: 100 g, Rfl: 2    isosorbide mononitrate (IMDUR) 30 MG 24 hr tablet, Take 30 mg by mouth once daily. Patient taking 1/2 tablet daily, Disp: , Rfl:     lisinopriL-hydrochlorothiazide (PRINZIDE,ZESTORETIC) 10-12.5 mg per tablet, Take 1 tablet by mouth once daily., Disp: 30 tablet, Rfl: 0    meloxicam (MOBIC) 7.5 MG tablet, Take 1 tablet (7.5 mg total) by mouth once daily. (Patient not taking: Reported on 12/31/2019), Disp: 30 tablet, Rfl: 0    Review of Systems   Constitutional: Negative for activity change, appetite change, chills, diaphoresis, fatigue and fever.   HENT: Negative for congestion, dental problem, ear discharge, ear pain, postnasal drip, rhinorrhea, sinus pressure and sinus pain.    Eyes: Negative for pain, discharge and itching.   Respiratory: Negative for cough, chest  "tightness and shortness of breath.    Cardiovascular: Negative for chest pain, palpitations and leg swelling.   Gastrointestinal: Negative for abdominal pain, constipation, diarrhea, nausea and vomiting.   Endocrine: Negative for polydipsia, polyphagia and polyuria.   Genitourinary: Negative for difficulty urinating, dysuria, frequency and urgency.   Musculoskeletal: Negative for arthralgias, gait problem and joint swelling.   Skin: Positive for wound (puncture wound to the right leg).   Allergic/Immunologic: Negative for environmental allergies.   Neurological: Negative for dizziness, syncope, weakness, light-headedness and headaches.   Psychiatric/Behavioral: Negative for agitation, behavioral problems, confusion, hallucinations and suicidal ideas. The patient is not nervous/anxious and is not hyperactive.        Objective:   /66 (BP Location: Left arm, Patient Position: Sitting, BP Method: X-Large (Automatic))   Pulse 79   Temp 98.6 °F (37 °C)   Ht 5' 4" (1.626 m)   Wt 79.3 kg (174 lb 13.2 oz)   SpO2 95%   BMI 30.01 kg/m²      Physical Exam  Vitals signs and nursing note reviewed.   Constitutional:       Appearance: Normal appearance. She is obese.   HENT:      Head: Normocephalic and atraumatic.      Right Ear: External ear normal.      Left Ear: External ear normal.      Nose: Nose normal.      Mouth/Throat:      Mouth: Mucous membranes are moist.   Eyes:      Conjunctiva/sclera: Conjunctivae normal.   Neck:      Musculoskeletal: Normal range of motion and neck supple.   Cardiovascular:      Rate and Rhythm: Normal rate.      Pulses: Normal pulses.   Pulmonary:      Effort: Pulmonary effort is normal. No respiratory distress.      Breath sounds: Normal breath sounds. No stridor. No wheezing, rhonchi or rales.   Chest:      Chest wall: No tenderness.   Abdominal:      General: Bowel sounds are normal.   Musculoskeletal: Normal range of motion.   Skin:     General: Skin is warm and dry.      Findings: " Wound present.          Neurological:      General: No focal deficit present.      Mental Status: She is alert and oriented to person, place, and time.      Sensory: No sensory deficit.      Motor: No weakness.      Coordination: Coordination normal.   Psychiatric:         Mood and Affect: Mood normal.         Behavior: Behavior normal.             Assessment & Plan     Problem List Items Addressed This Visit        Cardiac/Vascular    Essential hypertension    Current Assessment & Plan     Well controlled. Continue on same meds         Relevant Orders    Lipid Panel    CBC auto differential    Comprehensive metabolic panel       Orthopedic    Wound infection - Primary    Current Assessment & Plan     Poorly healing wound and signs of infection after rooster injury. Recommended keflex                 No follow-ups on file.    Disclaimer:  This note may have been prepared using voice recognition software, it may have not been extensively proofed, as such there could be errors within the text such as sound alike errors.

## 2020-07-04 ENCOUNTER — NURSE TRIAGE (OUTPATIENT)
Dept: ADMINISTRATIVE | Facility: CLINIC | Age: 85
End: 2020-07-04

## 2020-07-04 RX ORDER — CEPHALEXIN 500 MG/1
500 CAPSULE ORAL 3 TIMES DAILY
Qty: 21 CAPSULE | Refills: 0 | Status: SHIPPED | OUTPATIENT
Start: 2020-07-04 | End: 2020-07-11

## 2020-07-04 RX ORDER — LISINOPRIL AND HYDROCHLOROTHIAZIDE 10; 12.5 MG/1; MG/1
1 TABLET ORAL DAILY
Qty: 30 TABLET | Refills: 0 | Status: SHIPPED | OUTPATIENT
Start: 2020-07-04 | End: 2021-07-01

## 2020-07-04 NOTE — TELEPHONE ENCOUNTER
"  Reason for Disposition   Looks infected (red area, red streak, or pus)    Additional Information   Negative: [1] Major bleeding (e.g., actively dripping or spurting) AND [2] can't be stopped   Negative: Sounds like a life-threatening emergency to the triager   Negative: Snake bite   Negative: Bite, wound, or sting from fish   Negative: [1] Any break in skin (e.g., cut, puncture or scratch) AND[2] wild animal at risk for RABIES (e.g., bat, raccoon, zhang, skunk, coyote, other carnivores)   Negative: [1] Any break in skin (e.g., cut, puncture or scratch) AND[2] dog, cat, or ferret at risk for RABIES (e.g., sick, stray, unprovoked bite, developing country)   Negative: [1] Any break in skin (e.g., cut, puncture or scratch) AND[2] monkey   Negative: [1] Cut (length > 1/8 inch or 3 mm) or skin tear AND[2] any animal   Negative: [1] Bleeding AND [2] won't stop after 10 minutes of direct pressure (using correct technique)   Negative: Description of bite sounds severe to the triager   Negative: [1] Puncture wound or small cut AND [2] on hands or genitals   Negative: [1] Puncture wound or small cut AND [2] on face   Negative: [1] Puncture wound (hole through the skin) from claws or teeth AND [2] cat   Negative: [1] Non-bite body fluid contact (e.g., saliva, brain) AND [2] onto open cut/wound or mucous membranes AND[3] animal at high-risk for RABIES (e.g., bat, raccoon, zhang, skunk, coyote, other carnivores)    Answer Assessment - Initial Assessment Questions  1. ANIMAL: "What type of animal caused the bite?" "Is the injury from a bite or a claw?" If the animal is a dog or a cat, ask: "Was it a pet or a stray?" "Was it acting ill or behaving strangely?"    Rooster scratch on front of R leg   2. LOCATION: "Where is the bite located?"      R leg - about 10 days ago.   3. SIZE: "How big is the bite?" "What does it look like?"      almost inch long scab. 1/4 in wide. No drainage. Sore to touch.   4. ONSET: "When did " "the bite happen?" (Minutes or hours ago)     6/17  5. CIRCUMSTANCES: "Tell me how this happened."     Raised chicken and rooster   6. TETANUS: "When was the last tetanus booster?"     Yes    Protocols used: ST ANIMAL BITE-A-AH   Daughter in law Skylar called re seen levon cobian. Rooster scratched leg. MD was to send in BP meds and abx. Spoke with dr richie jacob TID x 7 days. Ok to refill BP med x 1 month.   [facility] LM on pharm VM at 1219pm.   "

## 2020-07-08 PROBLEM — T14.8XXA WOUND INFECTION: Status: ACTIVE | Noted: 2020-07-08

## 2020-07-08 PROBLEM — L08.9 WOUND INFECTION: Status: ACTIVE | Noted: 2020-07-08

## 2020-07-08 PROBLEM — I77.1 STRICTURE OF ARTERY: Status: RESOLVED | Noted: 2019-12-10 | Resolved: 2020-07-08

## 2021-03-11 ENCOUNTER — PES CALL (OUTPATIENT)
Dept: ADMINISTRATIVE | Facility: CLINIC | Age: 86
End: 2021-03-11

## 2021-06-24 ENCOUNTER — TELEPHONE (OUTPATIENT)
Dept: ADMINISTRATIVE | Facility: HOSPITAL | Age: 86
End: 2021-06-24

## 2021-07-01 RX ORDER — LISINOPRIL AND HYDROCHLOROTHIAZIDE 10; 12.5 MG/1; MG/1
1 TABLET ORAL DAILY
Qty: 90 TABLET | Refills: 0 | Status: SHIPPED | OUTPATIENT
Start: 2021-07-01

## 2021-08-26 ENCOUNTER — PATIENT OUTREACH (OUTPATIENT)
Dept: ADMINISTRATIVE | Facility: HOSPITAL | Age: 86
End: 2021-08-26

## 2021-09-10 ENCOUNTER — PATIENT OUTREACH (OUTPATIENT)
Dept: ADMINISTRATIVE | Facility: HOSPITAL | Age: 86
End: 2021-09-10

## 2021-11-18 NOTE — ASSESSMENT & PLAN NOTE
Normal lung sounds and her symptoms are reassuring considering that she is getting better slowly.  I reassured her and told her it is normal for her to feel fatigued considering that she is getting over pneumonia.  Getting  Follow-up chest x-ray today and repeating BMP to verify that electrolytes and renal function are normal.  Continue to finish course of Augmentin, use DuoNeb and if symptoms seem to be reverting at all, to come back to the emergency room over the weekend.  
The patient is a 14y Female complaining of psychiatric evaluation.

## 2021-12-14 ENCOUNTER — PATIENT OUTREACH (OUTPATIENT)
Dept: ADMINISTRATIVE | Facility: HOSPITAL | Age: 86
End: 2021-12-14
Payer: MEDICARE

## 2024-05-26 ENCOUNTER — HOSPITAL ENCOUNTER (EMERGENCY)
Facility: HOSPITAL | Age: 89
Discharge: HOME | End: 2024-05-26
Attending: EMERGENCY MEDICINE
Payer: MEDICARE

## 2024-05-26 VITALS
TEMPERATURE: 99 F | SYSTOLIC BLOOD PRESSURE: 144 MMHG | OXYGEN SATURATION: 95 % | RESPIRATION RATE: 20 BRPM | HEIGHT: 62 IN | HEART RATE: 65 BPM | BODY MASS INDEX: 30.36 KG/M2 | WEIGHT: 165 LBS | DIASTOLIC BLOOD PRESSURE: 65 MMHG

## 2024-05-26 DIAGNOSIS — S01.01XA LACERATION OF SCALP, INITIAL ENCOUNTER: ICD-10-CM

## 2024-05-26 DIAGNOSIS — S09.90XA INJURY OF HEAD, INITIAL ENCOUNTER: Primary | ICD-10-CM

## 2024-05-26 PROCEDURE — 12001 RPR S/N/AX/GEN/TRNK 2.5CM/<: CPT | Mod: ER

## 2024-05-26 PROCEDURE — 99285 EMERGENCY DEPT VISIT HI MDM: CPT | Mod: 25,ER

## 2024-05-26 NOTE — ED PROVIDER NOTES
Encounter Date: 5/26/2024       History     Chief Complaint   Patient presents with    Head Injury     Lac to head after falling out of hammock, pt deny LOC, not on blood thinners      Patient complains of falling out of a swing and a metal chain hit her in the top of the head.  Denies LOC is not on blood thinners patient is Telugu-speaking family interpreting for her says that she has been acting normal this whole time.        Review of patient's allergies indicates:   Allergen Reactions    Codeine      Past Medical History:   Diagnosis Date    Arthritis     Chest pain 8/19/2019    Community acquired pneumonia 12/28/2018    Hypertension     Macular degeneration     PNA (pneumonia)      Past Surgical History:   Procedure Laterality Date    CATARACT EXTRACTION W/  INTRAOCULAR LENS IMPLANT Left 03/09/2017    CATARACT EXTRACTION W/  INTRAOCULAR LENS IMPLANT Right     EYE SURGERY      HYSTERECTOMY      LEFT HEART CATHETERIZATION Left 8/19/2019    Procedure: CATHETERIZATION, HEART, LEFT-[place] pt;  Surgeon: Ozzie Camp MD;  Location: [facility] CATH LAB;  Service: Cardiology;  Laterality: Left;    PCIOL  Bilateral OD 10 YEARS AGO/OS 03/09/17    DR. CONCEPCION DID CAT. SX. OS ONLY     No family history on file.  Social History     Tobacco Use    Smoking status: Never    Smokeless tobacco: Never   Substance Use Topics    Alcohol use: No    Drug use: No     Review of Systems   Constitutional:  Negative for fever.   HENT:  Negative for sore throat.    Respiratory:  Negative for shortness of breath.    Cardiovascular:  Negative for chest pain.   Gastrointestinal:  Negative for nausea.   Genitourinary:  Negative for dysuria.   Musculoskeletal:  Negative for back pain.   Skin:  Negative for rash.   Neurological:  Negative for weakness.   Hematological:  Does not bruise/bleed easily.       Physical Exam     Initial Vitals [05/26/24 1500]   BP Pulse Resp Temp SpO2   138/87 93 20 98.5 °F (36.9 °C) 95 %      MAP       --          Physical Exam    Nursing note and vitals reviewed.  Constitutional: She appears well-developed and well-nourished. She is not diaphoretic. She is active.  Non-toxic appearance. No distress.   HENT:   Head: Normocephalic and atraumatic.   Eyes: Conjunctivae are normal. Right eye exhibits no discharge. Left eye exhibits no discharge. No scleral icterus.   Neck:   Normal range of motion.  Cardiovascular:  Normal rate, regular rhythm and intact distal pulses.           No murmur heard.  Pulmonary/Chest: Breath sounds normal. No respiratory distress. She has no wheezes.   Abdominal: She exhibits no distension.   Musculoskeletal:         General: No tenderness. Normal range of motion.      Cervical back: Normal range of motion.     Neurological: She is alert and oriented to person, place, and time. No cranial nerve deficit. GCS score is 15. GCS eye subscore is 4. GCS verbal subscore is 5. GCS motor subscore is 6.   Skin: Skin is warm and dry. Capillary refill takes less than 2 seconds. No rash noted.   Mid frontal 1 cm scalp laceration   Psychiatric: She has a normal mood and affect. Her behavior is normal. Judgment and thought content normal.         ED Course   Lac Repair    Date/Time: 5/26/2024 6:38 PM    Performed by: Sky Pineda NP  Authorized by: Kristina Burger DO    Consent:     Consent obtained:  Verbal    Consent given by:  Patient    Risks discussed:  Infection, pain, poor cosmetic result and poor wound healing  Universal protocol:     Patient identity confirmed:  Verbally with patient  Laceration details:     Location:  Scalp    Scalp location:  Frontal    Length (cm):  1  Exploration:     Hemostasis achieved with:  Direct pressure    Wound extent: no areolar tissue violation noted, no fascia violation noted, no nerve damage noted, no tendon damage noted and no underlying fracture noted    Treatment:     Area cleansed with:  Chlorhexidine  Skin repair:     Repair method:  Tissue  adhesive  Approximation:     Approximation:  Close  Repair type:     Repair type:  Simple  Post-procedure details:     Dressing:  Open (no dressing)    Labs Reviewed - No data to display       Imaging Results              CT Cervical Spine Without Contrast (Final result)  Result time 05/26/24 16:23:26      Final result by Jet Hope MD (05/26/24 16:23:26)                   Impression:      No acute fracture or dislocation.  Degenerative joint disease.    All CT scans   are performed using dose optimization techniques including the following: automated exposure control; adjustment of the mA and/or kV; use of iterative reconstruction technique.  Dose modulation was employed for ALARA by means of: Automated exposure control; adjustment of the mA and/or kV according to patient size (this includes techniques or standardized protocols for targeted exams where dose is matched to indication/reason for exam; i.e. extremities or head); and/or use of iterative reconstructive technique.      Electronically signed by: Jet Hope  Date:    05/26/2024  Time:    16:23               Narrative:    EXAMINATION:  CT CERVICAL SPINE WITHOUT CONTRAST    CLINICAL HISTORY:  Neck trauma (Age >= 65y);    TECHNIQUE:  Low dose axial images, sagittal and coronal reformations were performed though the cervical spine.  Contrast was not administered.    COMPARISON:  None    FINDINGS:  Normal vertebral body heights without evidence for spondylolisthesis.  Mild moderate degenerative joint disease.  No prevertebral soft tissue swelling.  Facet joints are congruent.  Normal bone mineral density.  Small ossicles at the C1-C2 articulation.                                       CT Head Without Contrast (Final result)  Result time 05/26/24 15:37:30      Final result by Rodolfo Tejada MD (05/26/24 15:37:30)                   Impression:      1.  Negative for acute intracranial process. Negative for hemorrhage, or skull fracture.    2.  Cerebral  atrophy noted.  Intracranial atherosclerotic disease noted.  Small vessel ischemic changes noted.    3.  Stable findings as noted above.    All CT scans at this facility are performed  using dose modulation techniques as appropriate to performed exam including the following:  automated exposure control; adjustment of mA and/or kV according to the patients size (this includes techniques or standardized protocols for targeted exams where dose is matched to indication/reason for exam: i.e. extremities or head);  iterative reconstruction technique.      Electronically signed by: Rodolfo Tejada MD  Date:    05/26/2024  Time:    15:37               Narrative:    EXAMINATION:  CT HEAD WITHOUT CONTRAST    CLINICAL HISTORY:  Head trauma, minor (Age >= 65y);    TECHNIQUE:  Axial images through the brain and posterior fossa were obtained without the use of IV contrast.  Sagittal and coronal reconstructions are provided for review.    COMPARISON:  July 18, 2019    FINDINGS:  The ventricles are midline and the CSF spaces are prom.  The gray-white matter junction is well preserved. Negative for intracranial vascular abnormalities. Negative for mass, mass effect, cerebral edema, hemorrhage or abnormal fluid collections.  Intracranial atherosclerotic disease noted.  Small vessel ischemic changes noted.  Hyperostosis frontalis interna    The skull and scalp are  intact.    The   paranasal sinuses, mastoid air cells, middle ears and ear canals are clear. The globes are intact. Postoperative changes to the lens regions.                                       Medications - No data to display  Medical Decision Making  Differential diagnosis considered but not limited to; scalp laceration, scalp contusion    Amount and/or Complexity of Data Reviewed  Radiology: ordered.                                      Clinical Impression:  Final diagnoses:  [S09.90XA] Injury of head, initial encounter (Primary)  [S01.01XA] Laceration of scalp, initial  encounter          ED Disposition Condition    Discharge Stable          ED Prescriptions    None       Follow-up Information       Follow up With Specialties Details Why Contact Info    pcp                 Sky Pineda NP  05/26/24 0716